# Patient Record
Sex: MALE | Race: WHITE | Employment: FULL TIME | URBAN - METROPOLITAN AREA
[De-identification: names, ages, dates, MRNs, and addresses within clinical notes are randomized per-mention and may not be internally consistent; named-entity substitution may affect disease eponyms.]

---

## 2018-03-01 PROBLEM — Z79.899 MEDICATION MANAGEMENT: Status: ACTIVE | Noted: 2018-03-01

## 2019-02-04 DIAGNOSIS — Z79.899 MEDICATION MANAGEMENT: ICD-10-CM

## 2019-02-04 DIAGNOSIS — F41.9 ANXIETY: ICD-10-CM

## 2019-02-05 RX ORDER — CLONAZEPAM 1 MG/1
1 TABLET ORAL 2 TIMES DAILY PRN
Qty: 60 TABLET | Refills: 0 | Status: SHIPPED | OUTPATIENT
Start: 2019-02-05 | End: 2019-03-20 | Stop reason: SDUPTHER

## 2019-02-12 ENCOUNTER — TELEPHONE (OUTPATIENT)
Dept: PRIMARY CARE CLINIC | Age: 39
End: 2019-02-12

## 2019-02-12 DIAGNOSIS — I10 ESSENTIAL HYPERTENSION: Primary | ICD-10-CM

## 2019-02-12 RX ORDER — IRBESARTAN 150 MG/1
150 TABLET ORAL DAILY
Qty: 90 TABLET | Refills: 0 | Status: SHIPPED | OUTPATIENT
Start: 2019-02-12 | End: 2019-12-18 | Stop reason: SDUPTHER

## 2019-03-20 DIAGNOSIS — K21.9 GASTROESOPHAGEAL REFLUX DISEASE WITHOUT ESOPHAGITIS: ICD-10-CM

## 2019-03-20 DIAGNOSIS — Z79.899 MEDICATION MANAGEMENT: ICD-10-CM

## 2019-03-20 DIAGNOSIS — F41.9 ANXIETY: ICD-10-CM

## 2019-03-20 RX ORDER — OMEPRAZOLE 20 MG/1
20 CAPSULE, DELAYED RELEASE ORAL DAILY
Qty: 90 CAPSULE | Refills: 1 | Status: SHIPPED | OUTPATIENT
Start: 2019-03-20 | End: 2019-08-28 | Stop reason: SDUPTHER

## 2019-03-20 RX ORDER — CLONAZEPAM 1 MG/1
1 TABLET ORAL 2 TIMES DAILY PRN
Qty: 60 TABLET | Refills: 0 | Status: SHIPPED | OUTPATIENT
Start: 2019-03-20 | End: 2019-08-20 | Stop reason: SDUPTHER

## 2019-08-20 DIAGNOSIS — Z79.899 MEDICATION MANAGEMENT: ICD-10-CM

## 2019-08-20 DIAGNOSIS — F41.9 ANXIETY: ICD-10-CM

## 2019-08-20 RX ORDER — CLONAZEPAM 1 MG/1
1 TABLET ORAL 2 TIMES DAILY PRN
Qty: 14 TABLET | Refills: 0 | Status: SHIPPED | OUTPATIENT
Start: 2019-08-20 | End: 2019-08-28 | Stop reason: SDUPTHER

## 2019-08-20 NOTE — TELEPHONE ENCOUNTER
OV: 11/27/19, LF: 3/20/19. Out of medication, Reports now back in area, has few days off work next week and has scheduled appt for 8/28/19. Requesting short term to get through until appt-takes PRN.

## 2019-08-28 ENCOUNTER — OFFICE VISIT (OUTPATIENT)
Dept: PRIMARY CARE CLINIC | Age: 39
End: 2019-08-28
Payer: COMMERCIAL

## 2019-08-28 VITALS
SYSTOLIC BLOOD PRESSURE: 118 MMHG | HEART RATE: 71 BPM | BODY MASS INDEX: 28.85 KG/M2 | OXYGEN SATURATION: 99 % | WEIGHT: 184.2 LBS | DIASTOLIC BLOOD PRESSURE: 70 MMHG

## 2019-08-28 DIAGNOSIS — J45.40 MODERATE PERSISTENT ASTHMA WITHOUT COMPLICATION: ICD-10-CM

## 2019-08-28 DIAGNOSIS — Z28.21 REFUSED INFLUENZA VACCINE: ICD-10-CM

## 2019-08-28 DIAGNOSIS — Z79.899 MEDICATION MANAGEMENT: Primary | ICD-10-CM

## 2019-08-28 DIAGNOSIS — I10 ESSENTIAL HYPERTENSION: ICD-10-CM

## 2019-08-28 DIAGNOSIS — F41.9 ANXIETY: ICD-10-CM

## 2019-08-28 PROCEDURE — 99214 OFFICE O/P EST MOD 30 MIN: CPT | Performed by: PHYSICIAN ASSISTANT

## 2019-08-28 RX ORDER — CLONAZEPAM 1 MG/1
1 TABLET ORAL 2 TIMES DAILY PRN
Qty: 60 TABLET | Refills: 0 | Status: SHIPPED | OUTPATIENT
Start: 2019-08-28 | End: 2019-12-18 | Stop reason: SDUPTHER

## 2019-08-28 RX ORDER — OMEPRAZOLE 20 MG/1
20 CAPSULE, DELAYED RELEASE ORAL DAILY
Qty: 90 CAPSULE | Refills: 1 | Status: SHIPPED | OUTPATIENT
Start: 2019-08-28 | End: 2020-03-13 | Stop reason: SDUPTHER

## 2019-08-28 ASSESSMENT — PATIENT HEALTH QUESTIONNAIRE - PHQ9
2. FEELING DOWN, DEPRESSED OR HOPELESS: 0
SUM OF ALL RESPONSES TO PHQ9 QUESTIONS 1 & 2: 0
SUM OF ALL RESPONSES TO PHQ QUESTIONS 1-9: 0
1. LITTLE INTEREST OR PLEASURE IN DOING THINGS: 0
SUM OF ALL RESPONSES TO PHQ QUESTIONS 1-9: 0

## 2019-08-28 NOTE — PROGRESS NOTES
[address] [facility] PRIMARY CARE  [address] [facility] [address]  Dept: [phone]    Asuncion Orta is a 43 y.o. male who presents today for his medical conditions/complaintsas noted below. Chief Complaint   Patient presents with    Hypertension     med check    Anxiety     med check. Pt states he was getting better for awhile, however, the last couple weeks he has been using the Klonopin more often. Pt's son is going through gender transition, pt having hard time coping. 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 Got home a couple weeks ago. Anxiety:  Lots of stress lately. Feels overwhelmed, but not depressed.  -Oldest son dx with Hep C due to drug use- in rehab in [place], but [address] in for pt's 39th bday  -Daughter has been depressed  -Youngest son is transgender and transitioning to female. Last couple weeks, he has taken klonopin at least once per day, needs two tabs a couple days per week. Tries to take the klonopin when first feels heavier breathing and sweating. Not having full blown panic attacks because he is catching the symptoms early. Declined daily medication for anxiety. Getting a puppy in 5 weeks, which he hopes will help his anxiety. Did not get labs done. Took ziacam and vitamins while he was deployed   Thinks he had a tetanus vaccine about 2 years ago after cutting his hand on a fence. No issues with BP, but has not checked it. No CP, SOB, leg swelling, lightheadedness, or frequent HAs. Asthma-   Not using symbicort unless he feels he needs it, which is about once per month. Hasn't used albuterol in a long time. Cats trigger his symptoms. Occasional cough, but not as bad as it was when he first moved here. No SOB or wheezing.       No results found for: LDLCHOLESTEROL, LDLCALC    (goal LDL is Sig: Take 1 tablet by mouth 2 times daily as needed for Anxiety for up to 30 days. Dispense:  60 tablet     Refill:  0       Patient given educationalmaterials - see patient instructions. Discussed use, benefit, and side effectsof prescribed medications. All patient questions answered. Pt voiced understanding. Reviewed health maintenance. Instructed to continue current medications, diet andexercise. Patient agreed with treatment plan. Follow up as directed.      Electronicallysigned by Francine Mcguire PA-C on 9/4/2019 at 2:54 AM

## 2019-09-04 ASSESSMENT — ENCOUNTER SYMPTOMS
WHEEZING: 0
SHORTNESS OF BREATH: 0

## 2019-12-18 RX ORDER — CLONAZEPAM 1 MG/1
1 TABLET ORAL 2 TIMES DAILY PRN
Qty: 60 TABLET | Refills: 0 | Status: SHIPPED | OUTPATIENT
Start: 2019-12-18 | End: 2020-03-13 | Stop reason: SDUPTHER

## 2019-12-18 RX ORDER — IRBESARTAN 150 MG/1
150 TABLET ORAL DAILY
Qty: 90 TABLET | Refills: 1 | Status: SHIPPED | OUTPATIENT
Start: 2019-12-18 | End: 2020-06-13

## 2019-12-18 NOTE — TELEPHONE ENCOUNTER
Med refill  Last ov 8/28/19    clonazepam  Last ordered 8/28/19    Irbesartan  Last ordered 2/12/19    Kunal Vu

## 2020-03-12 NOTE — TELEPHONE ENCOUNTER
Pt cld scheduled appt for quarterly med check for Friday 3/20 but is in need of refills as he will run out prior to this appt- Pt requesting refills on 4 meds:    Clonazepam 1 mg  Irbesartan-150 mg  Omeprazole 20 mg  Simethicone 80 mg     He would like scripts to go to [email or web address] in [address] [place] [address] JJJ#[phone]     Please contact pt once completed at #[phone]    Last ov-8/28/19  Next ov-3/20/20

## 2020-03-13 RX ORDER — IRBESARTAN 150 MG/1
150 TABLET ORAL DAILY
Qty: 90 TABLET | Refills: 1 | OUTPATIENT
Start: 2020-03-13

## 2020-03-13 RX ORDER — OMEPRAZOLE 20 MG/1
20 CAPSULE, DELAYED RELEASE ORAL DAILY
Qty: 90 CAPSULE | Refills: 1 | Status: SHIPPED | OUTPATIENT
Start: 2020-03-13 | End: 2020-11-13 | Stop reason: SDUPTHER

## 2020-03-13 RX ORDER — CLONAZEPAM 1 MG/1
1 TABLET ORAL 2 TIMES DAILY PRN
Qty: 7 TABLET | Refills: 0 | Status: SHIPPED | OUTPATIENT
Start: 2020-03-13 | End: 2020-03-19 | Stop reason: SDUPTHER

## 2020-03-19 ENCOUNTER — OFFICE VISIT (OUTPATIENT)
Dept: PRIMARY CARE CLINIC | Age: 40
End: 2020-03-19
Payer: COMMERCIAL

## 2020-03-19 VITALS
HEART RATE: 76 BPM | DIASTOLIC BLOOD PRESSURE: 74 MMHG | BODY MASS INDEX: 31.17 KG/M2 | HEIGHT: 67 IN | SYSTOLIC BLOOD PRESSURE: 122 MMHG | WEIGHT: 198.6 LBS | OXYGEN SATURATION: 97 % | TEMPERATURE: 98.7 F

## 2020-03-19 PROCEDURE — 99213 OFFICE O/P EST LOW 20 MIN: CPT | Performed by: PHYSICIAN ASSISTANT

## 2020-03-19 RX ORDER — CLONAZEPAM 1 MG/1
1 TABLET ORAL 2 TIMES DAILY PRN
Qty: 60 TABLET | Refills: 0 | Status: SHIPPED | OUTPATIENT
Start: 2020-03-19 | End: 2020-04-29 | Stop reason: SDUPTHER

## 2020-03-19 ASSESSMENT — PATIENT HEALTH QUESTIONNAIRE - PHQ9
2. FEELING DOWN, DEPRESSED OR HOPELESS: 0
SUM OF ALL RESPONSES TO PHQ QUESTIONS 1-9: 0
1. LITTLE INTEREST OR PLEASURE IN DOING THINGS: 0
SUM OF ALL RESPONSES TO PHQ QUESTIONS 1-9: 0
SUM OF ALL RESPONSES TO PHQ9 QUESTIONS 1 & 2: 0

## 2020-03-19 ASSESSMENT — ENCOUNTER SYMPTOMS
SHORTNESS OF BREATH: 0
WHEEZING: 0
COUGH: 1

## 2020-03-19 NOTE — PROGRESS NOTES
[address] [facility] PRIMARY CARE  [address] [facility] [address]  Dept: [phone]    Jennifer Pierce is a 35 y.o. male who presents today for his medical conditions/complaintsas noted below. Chief Complaint   Patient presents with    Anxiety     med check    Medication Refill     clonazePAM (KLONOPIN) 1 MG       HPI:     HPI   HTN- BP good. Has not checked it. Says he was not good the last month, but has been better about taking his irbesartan more consistently since he picked up his refill. Anxiety- Was doing better, but anxiety ramped up again the last month. His youngest son just got out of juvenile [facility]. Still dealing with daughter's depression, she is out of work. 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 Has seen therapist 3-4x the last month, Dickson Gill. Did EMDR, but more so stress relief techniques now. Med contract on file from 8/28/2019. Taking klonopin at least once per day, every few days, he needs 2 tabs per day. Asthma- no flare ups. Hasn't used symbicort or albuterol in the last month. No wheezing, no SOB. Coughs only when anxious. When his son turns 24 YO in 4 months, he plants to request a transfer to [address] [facility].      No results found for: LDLCHOLESTEROL, LDLCALC    (goal LDL is <100)   No results found for: AST, ALT, BUN  BP Readings from Last 3 Encounters:   03/19/20 122/74   08/28/19 118/70   11/27/18 128/78          (goal 120/80)    Past Medical History:   Diagnosis Date    Anxiety     Asthma     Hypertension       Past Surgical History:   Procedure Laterality Date    CYST INCISION AND DRAINAGE      KNEE SURGERY      KNEE SURGERY Right        Family History   Problem Relation Age of Onset    Other Mother     No Known Problems Father     Cancer Maternal Uncle         abdominal area       Social History     Tobacco Use    Smoking status: Light Tobacco Smoker     Types: Cigarettes  Smokeless tobacco: Never Used   Substance Use Topics    Alcohol use: Yes     Comment: socially      Current Outpatient Medications   Medication Sig Dispense Refill    clonazePAM (KLONOPIN) 1 MG tablet Take 1 tablet by mouth 2 times daily as needed for Anxiety for up to 30 days. 60 tablet 0    omeprazole (PRILOSEC) 20 MG delayed release capsule Take 1 capsule by mouth Daily 90 capsule 1    Simethicone 80 MG TABS Take 80 mg by mouth every 6 hours as needed (bloating) After meals 90 tablet 1    irbesartan (AVAPRO) 150 MG tablet Take 1 tablet by mouth daily 90 tablet 1    albuterol sulfate HFA (PROAIR HFA) 108 (90 BASE) MCG/ACT inhaler Inhale 2 puffs into the lungs every 6 hours as needed for Wheezing 1 Inhaler 3    budesonide-formoterol (SYMBICORT) 80-4.5 MCG/ACT AERO Inhale 2 puffs into the lungs 2 times daily 1 Inhaler 2     No current facility-administered medications for this visit. Allergies   Allergen Reactions    Other      Animals- cats mostly       Health Maintenance   Topic Date Due    Potassium monitoring  1979    Creatinine monitoring  1979    HIV screen  09/26/1994    DTaP/Tdap/Td vaccine (1 - Tdap) 09/26/1998    Lipid screen  09/26/2019    Diabetes screen  09/26/2019    Flu vaccine (1) 09/01/2020 (Originally 9/1/2019)    Shingles Vaccine (1 of 2) 09/26/2029    Pneumococcal 0-64 years Vaccine  Completed    Hepatitis A vaccine  Aged Out    Hepatitis B vaccine  Aged Out    Hib vaccine  Aged Out    Meningococcal (ACWY) vaccine  Aged Out       Subjective:      Review of Systems   Respiratory: Positive for cough (when anxious). Negative for shortness of breath and wheezing. Cardiovascular: Negative for chest pain and leg swelling. Neurological: Negative for light-headedness and headaches. Psychiatric/Behavioral: The patient is nervous/anxious.         Objective:     /74 (Site: Left Upper Arm, Position: Sitting)   Pulse 76   Temp 98.7 °F (37.1 °C)   Ht 5' 7\" (1.702 m)   Wt 198 lb 9.6 oz (90.1 kg)   SpO2 97%   BMI 31.11 kg/m²   Physical Exam  Vitals signs and nursing note reviewed. Constitutional:       General: He is not in acute distress. Appearance: He is well-developed. He is not diaphoretic. HENT:      Head: Normocephalic and atraumatic. Neck:      Vascular: No carotid bruit. Cardiovascular:      Rate and Rhythm: Normal rate and regular rhythm. Heart sounds: Normal heart sounds. Comments: No lower leg edema  Pulmonary:      Effort: Pulmonary effort is normal.      Breath sounds: Normal breath sounds. Skin:     Findings: No rash. Neurological:      Mental Status: He is alert. Psychiatric:         Behavior: Behavior normal.         Assessment:       Diagnosis Orders   1. Essential hypertension  Basic Metabolic Panel   2. Medication management  clonazePAM (KLONOPIN) 1 MG tablet   3. Anxiety  clonazePAM (KLONOPIN) 1 MG tablet   4. Uncomplicated asthma, unspecified asthma severity, unspecified whether persistent          Plan:    1. HTN- Continue current dose of irbesartan. Pt still has not gotten labs done, but SONA Snell reminded pt to do so. 2-3 Anxiety- med contract on file. Continue seeing therapist and decrease use of klonopin again when able. 4. Asthma- Pt denies any symptoms recently and has not had to use his inhalers. Return in about 3 months (around 6/19/2020) for anxiety, HTN. Orders Placed This Encounter   Procedures    Basic Metabolic Panel     Standing Status:   Future     Standing Expiration Date:   3/19/2021     Orders Placed This Encounter   Medications    clonazePAM (KLONOPIN) 1 MG tablet     Sig: Take 1 tablet by mouth 2 times daily as needed for Anxiety for up to 30 days. Dispense:  60 tablet     Refill:  0       Patient given educationalmaterials - see patient instructions. Discussed use, benefit, and side effectsof prescribed medications. All patient questions answered.  Pt voiced understanding. Reviewed health maintenance. Instructed to continue current medications, diet andexercise. Patient agreed with treatment plan. Follow up as directed.      Electronicallysigned by Dayna Hope PA-C on 3/19/2020 at 12:04 PM

## 2020-04-29 RX ORDER — CLONAZEPAM 1 MG/1
1 TABLET ORAL 2 TIMES DAILY PRN
Qty: 60 TABLET | Refills: 0 | Status: SHIPPED | OUTPATIENT
Start: 2020-04-29 | End: 2020-08-14 | Stop reason: SDUPTHER

## 2020-06-13 RX ORDER — IRBESARTAN 150 MG/1
TABLET ORAL
Qty: 90 TABLET | Refills: 1 | Status: SHIPPED | OUTPATIENT
Start: 2020-06-13 | End: 2020-11-13 | Stop reason: SDUPTHER

## 2020-08-14 RX ORDER — CLONAZEPAM 1 MG/1
1 TABLET ORAL 2 TIMES DAILY PRN
Qty: 60 TABLET | Refills: 0 | Status: SHIPPED | OUTPATIENT
Start: 2020-08-14 | End: 2020-11-13 | Stop reason: SDUPTHER

## 2020-11-09 RX ORDER — CLONAZEPAM 1 MG/1
1 TABLET ORAL 2 TIMES DAILY PRN
Qty: 60 TABLET | Refills: 0 | OUTPATIENT
Start: 2020-11-09 | End: 2020-12-09

## 2020-11-13 ENCOUNTER — TELEMEDICINE (OUTPATIENT)
Dept: PRIMARY CARE CLINIC | Age: 40
End: 2020-11-13
Payer: COMMERCIAL

## 2020-11-13 PROCEDURE — 99214 OFFICE O/P EST MOD 30 MIN: CPT | Performed by: NURSE PRACTITIONER

## 2020-11-13 RX ORDER — IRBESARTAN 150 MG/1
TABLET ORAL
Qty: 90 TABLET | Refills: 0 | Status: SHIPPED | OUTPATIENT
Start: 2020-11-13 | End: 2021-03-03 | Stop reason: SDUPTHER

## 2020-11-13 RX ORDER — OMEPRAZOLE 20 MG/1
20 CAPSULE, DELAYED RELEASE ORAL DAILY
Qty: 90 CAPSULE | Refills: 0 | Status: SHIPPED | OUTPATIENT
Start: 2020-11-13 | End: 2021-12-07 | Stop reason: SDUPTHER

## 2020-11-13 RX ORDER — CLONAZEPAM 1 MG/1
1 TABLET ORAL 2 TIMES DAILY PRN
Qty: 60 TABLET | Refills: 0 | Status: SHIPPED | OUTPATIENT
Start: 2020-11-13 | End: 2020-12-28 | Stop reason: SDUPTHER

## 2020-11-13 ASSESSMENT — ENCOUNTER SYMPTOMS
COUGH: 0
SHORTNESS OF BREATH: 0
DIARRHEA: 0
WHEEZING: 0
VOMITING: 0
NAUSEA: 0

## 2020-11-13 NOTE — PROGRESS NOTES
[address] [facility] PRIMARY CARE  [address] [address] [facility] [place] [address]  Dept: [phone]    Kaci Kumar is a 38 y.o. male who presents today for his medical conditions/complaints as noted below. Chief Complaint   Patient presents with    Medication Check       HPI:     HPI Gayecorbinhalie Smith was originally scheduled an in person visit, but because my MD has requested I am out of the office during [facility] testing, we switched him to virtual. The patient is doing well other than some stress and anxiety going on in his family. His 25year old son has been in and out of long-term and is currently homeless and he worries a lot about him. His apartment complex will not allow his son to live on the premise d/t background check. Other than that, he is doing ok. No fever, chills, body aches, coughing, sob, wheezing, chest pains or heart palpitations. He has not completed the blood work ordered in March. Patient is traveling to [address] [place] [address] for work in the next couple of days.  He was frustrated with how the office handled his RX request.     No results found for: LDLCHOLESTEROL, [address]    (goal LDL is <100)   No results found for: AST, ALT, BUN  BP Readings from Last 3 Encounters:   03/19/20 122/74   08/28/19 118/70   11/27/18 128/78          (goal 120/80)    Past Medical History:   Diagnosis Date    Anxiety     Asthma     Hypertension       Past Surgical History:   Procedure Laterality Date    CYST INCISION AND DRAINAGE      KNEE SURGERY      KNEE SURGERY Right        Family History   Problem Relation Age of Onset    Other Mother     No Known Problems Father     Cancer Maternal Uncle         abdominal area       Social History     Tobacco Use    Smoking status: Light Tobacco Smoker     Types: Cigarettes    Smokeless tobacco: Never Used   Substance Use Topics    Alcohol use: Yes     Comment: socially      Current Outpatient Medications   Medication Sig Dispense Refill    omeprazole (PRILOSEC) 20 MG delayed release capsule Take 1 capsule by mouth Daily 90 capsule 0    irbesartan (AVAPRO) 150 MG tablet TAKE ONE TABLET BY MOUTH DAILY 90 tablet 0    clonazePAM (KLONOPIN) 1 MG tablet Take 1 tablet by mouth 2 times daily as needed for Anxiety for up to 30 days. 60 tablet 0    Simethicone 80 MG TABS Take 80 mg by mouth every 6 hours as needed (bloating) After meals 90 tablet 1    albuterol sulfate HFA (PROAIR HFA) 108 (90 BASE) MCG/ACT inhaler Inhale 2 puffs into the lungs every 6 hours as needed for Wheezing 1 Inhaler 3    budesonide-formoterol (SYMBICORT) 80-4.5 MCG/ACT AERO Inhale 2 puffs into the lungs 2 times daily 1 Inhaler 2     No current facility-administered medications for this visit. Allergies   Allergen Reactions    Other      Animals- cats mostly       Health Maintenance   Topic Date Due    Potassium monitoring  1979    Creatinine monitoring  1979    HIV screen  09/26/1994    DTaP/Tdap/Td vaccine (1 - Tdap) 09/26/1998    Lipid screen  09/26/2019    Diabetes screen  09/26/2019    Flu vaccine (1) 09/01/2020    Pneumococcal 0-64 years Vaccine  Completed    Hepatitis A vaccine  Aged Out    Hepatitis B vaccine  Aged Out    Hib vaccine  Aged Out    Meningococcal (ACWY) vaccine  Aged Out       Subjective:      Review of Systems   Constitutional: Negative for chills and fever. Respiratory: Negative for cough, shortness of breath and wheezing. Cardiovascular: Negative for chest pain and palpitations. Gastrointestinal: Negative for diarrhea, nausea and vomiting. Musculoskeletal: Negative for myalgias. Neurological: Negative for dizziness, light-headedness and headaches. Psychiatric/Behavioral: Negative for self-injury, sleep disturbance and suicidal ideas. The patient is nervous/anxious. +stress     Objective: There were no vitals taken for this visit. Physical Exam  Nursing note (limited assessment due to virtual video visit) reviewed. Vitals reviewed: none obtained. Constitutional:       Appearance: Normal appearance. HENT:      Head: Normocephalic and atraumatic. Neurological:      General: No focal deficit present. Mental Status: He is alert and oriented to person, place, and time. Mental status is at baseline. Psychiatric:         Attention and Perception: Attention and perception normal.         Mood and Affect: Mood and affect normal.         Speech: Speech normal.         Behavior: Behavior normal. Behavior is cooperative. Thought Content: Thought content normal.         Cognition and Memory: Cognition and memory normal.         Judgment: Judgment normal.       Controlled Substance Monitoring:    Acute and Chronic Pain Monitoring:   RX Monitoring 11/13/2020   Attestation -   Periodic Controlled Substance Monitoring Possible medication side effects, risk of tolerance/dependence & alternative treatments discussed. ;No signs of potential drug abuse or diversion identified. ;Assessed functional status. Chronic Pain > 80 MEDD -       Assessment:       Diagnosis Orders   1. Essential hypertension  irbesartan (AVAPRO) 150 MG tablet   2. Anxiety  clonazePAM (KLONOPIN) 1 MG tablet   3. Medication management  clonazePAM (KLONOPIN) 1 MG tablet   4. Acid indigestion  omeprazole (PRILOSEC) 20 MG delayed release capsule   5. Stress at home          Plan:    1. Complete blood work at [facility]. 2. Medication refills sent. 3. Follow up in person 3 months. 4. No vitals were obtained during the rooming process. Patient was short with staff and hung up. He wasn't happy that he had to do a virtual visit. 5. Call 911 if you develop chest pains, difficulty breathing or sudden severe headache. 6. Patient will be due for med contract renewal and drug screen during next office visit. Rowe Habermann is a 38 y.o. male being evaluated by a Virtual Visit (video visit) encounter to address concerns as mentioned above.   A caregiver was present when appropriate. Due to this being a TeleHealth encounter (During [facility]WL[facility]-57 public [facility] emergency), evaluation of the following organ systems was limited: Vitals/Constitutional/EENT/Resp/CV/GI//MS/Neuro/Skin/Heme-Lymph-Imm. Pursuant to the emergency declaration under the [facility], [address] [place] [address] and the [employer] Act, this Virtual Visit was conducted with patient's (and/or legal guardian's) consent, to reduce the patient's risk of exposure to COVID-19 and provide necessary medical care. The patient (and/or legal guardian) has also been advised to contact this office for worsening conditions or problems, and seek emergency medical treatment and/or call 911 if deemed necessary. Patient identification was verified at the start of the visit: Yes    Total time spent for this encounter: 25 minutes    Services were provided through a video synchronous discussion virtually to substitute for in-person clinic visit. Patient and provider were located at their individual homes. --CASANDRA Mccarty CNP on 11/13/2020 at 2:55 PM    An electronic signature was used to authenticate this note. Return in about 3 months (around 2/13/2021) for f/u hypertension, f/u depression/anxiety. No orders of the defined types were placed in this encounter. Orders Placed This Encounter   Medications    omeprazole (PRILOSEC) 20 MG delayed release capsule     Sig: Take 1 capsule by mouth Daily     Dispense:  90 capsule     Refill:  0    irbesartan (AVAPRO) 150 MG tablet     Sig: TAKE ONE TABLET BY MOUTH DAILY     Dispense:  90 tablet     Refill:  0    clonazePAM (KLONOPIN) 1 MG tablet     Sig: Take 1 tablet by mouth 2 times daily as needed for Anxiety for up to 30 days. Dispense:  60 tablet     Refill:  0       Patient given educationalmaterials - see patient instructions.   Discussed use, benefit, and side effectsof prescribed medications. All patient questions answered. Pt voiced understanding. Reviewed health maintenance. Instructed to continue current medications, diet andexercise. Patient agreed with treatment plan. Follow up as directed.      Electronicallysigned by CASANDRA Worrell CNP on 11/13/2020 at 2:55 PM

## 2020-11-13 NOTE — PATIENT INSTRUCTIONS
Patient Education        Anxiety Disorder: Care Instructions  Your Care Instructions     Anxiety is a normal reaction to stress. Difficult situations can cause you to have symptoms such as sweaty palms and a nervous feeling. In an anxiety disorder, the symptoms are far more severe. Constant worry, muscle tension, trouble sleeping, nausea and diarrhea, and other symptoms can make normal daily activities difficult or impossible. These symptoms may occur for no reason, and they can affect your work, school, or social life. Medicines, counseling, and self-care can all help. Follow-up care is a key part of your treatment and safety. Be sure to make and go to all appointments, and call your doctor if you are having problems. It's also a good idea to know your test results and keep a list of the medicines you take. How can you care for yourself at home? · Take medicines exactly as directed. Call your doctor if you think you are having a problem with your medicine. · Go to your counseling sessions and follow-up appointments. · Recognize and accept your anxiety. Then, when you are in a situation that makes you anxious, say to yourself, \"This is not an emergency. I feel uncomfortable, but I am not in danger. I can keep going even if I feel anxious. \"  · Be kind to your body:  ? Relieve tension with exercise or a massage. ? Get enough rest.  ? Avoid alcohol, caffeine, nicotine, and illegal drugs. They can increase your anxiety level and cause sleep problems. ? Learn and do relaxation techniques. See below for more about these techniques. · Engage your mind. Get out and do something you enjoy. Go to a funny movie, or take a walk or hike. Plan your day. Having too much or too little to do can make you anxious. · Keep a record of your symptoms. Discuss your fears with a good friend or family member, or join a support group for people with similar problems. Talking to others sometimes relieves stress.   · Get involved in play a relaxation tape while you drive a car. When should you call for help? Call 911 anytime you think you may need emergency care. For example, call if:    · You feel you cannot stop from hurting yourself or someone else. Keep the numbers for these national suicide hotlines: 4-758-170-TALK (4-750.882.6342) and 6-205-QTXGKSG (6-885.415.5277). If you or someone you know talks about suicide or feeling hopeless, get help right away. Watch closely for changes in your health, and be sure to contact your doctor if:    · You have anxiety or fear that affects your life.     · You have symptoms of anxiety that are new or different from those you had before. Where can you learn more? Go to https://Keeps[employer]ACE Healtheb.51intern.com Ã¨â€¹Â±Ã¨â€¦Â¾Ã§Â½â€˜. org and sign in to your [employer] account. Enter P754 in the [employer] box to learn more about \"Anxiety Disorder: Care Instructions. \"     If you do not have an account, please click on the \"Sign Up Now\" link. Current as of: January 31, 2020               Content Version: 12.6  © 3254-4020 [employer]. Care instructions adapted under license by [facility] ([facility]). If you have questions about a medical condition or this instruction, always ask your healthcare professional. [address] any warranty or liability for your use of this information.

## 2020-12-28 RX ORDER — CLONAZEPAM 1 MG/1
1 TABLET ORAL 2 TIMES DAILY PRN
Qty: 60 TABLET | Refills: 0 | Status: SHIPPED | OUTPATIENT
Start: 2020-12-28 | End: 2021-03-04 | Stop reason: SDUPTHER

## 2021-03-01 ENCOUNTER — TELEMEDICINE (OUTPATIENT)
Dept: PRIMARY CARE CLINIC | Age: 41
End: 2021-03-01
Payer: COMMERCIAL

## 2021-03-01 DIAGNOSIS — Z79.899 MEDICATION MANAGEMENT: ICD-10-CM

## 2021-03-01 DIAGNOSIS — F41.9 ANXIETY: ICD-10-CM

## 2021-03-01 DIAGNOSIS — R19.7 DIARRHEA, UNSPECIFIED TYPE: Primary | ICD-10-CM

## 2021-03-01 PROCEDURE — 99213 OFFICE O/P EST LOW 20 MIN: CPT | Performed by: PHYSICIAN ASSISTANT

## 2021-03-01 SDOH — ECONOMIC STABILITY: INCOME INSECURITY: HOW HARD IS IT FOR YOU TO PAY FOR THE VERY BASICS LIKE FOOD, HOUSING, MEDICAL CARE, AND HEATING?: PATIENT DECLINED

## 2021-03-01 SDOH — ECONOMIC STABILITY: TRANSPORTATION INSECURITY
IN THE PAST 12 MONTHS, HAS LACK OF TRANSPORTATION KEPT YOU FROM MEETINGS, WORK, OR FROM GETTING THINGS NEEDED FOR DAILY LIVING?: PATIENT DECLINED

## 2021-03-01 ASSESSMENT — PATIENT HEALTH QUESTIONNAIRE - PHQ9
SUM OF ALL RESPONSES TO PHQ QUESTIONS 1-9: 0
1. LITTLE INTEREST OR PLEASURE IN DOING THINGS: 0
SUM OF ALL RESPONSES TO PHQ9 QUESTIONS 1 & 2: 0

## 2021-03-01 NOTE — PROGRESS NOTES
Topics    Alcohol use: Yes     Comment: socially      Current Outpatient Medications   Medication Sig Dispense Refill    omeprazole (PRILOSEC) 20 MG delayed release capsule Take 1 capsule by mouth Daily 90 capsule 0    albuterol sulfate HFA (PROAIR HFA) 108 (90 BASE) MCG/ACT inhaler Inhale 2 puffs into the lungs every 6 hours as needed for Wheezing 1 Inhaler 3    budesonide-formoterol (SYMBICORT) 80-4.5 MCG/ACT AERO Inhale 2 puffs into the lungs 2 times daily 1 Inhaler 2    Simethicone 80 MG TABS Take 80 mg by mouth every 6 hours as needed (bloating) After meals 90 tablet 1    clonazePAM (KLONOPIN) 1 MG tablet Take 1 tablet by mouth 2 times daily as needed for Anxiety for up to 30 days. 60 tablet 0    irbesartan (AVAPRO) 150 MG tablet TAKE ONE TABLET BY MOUTH DAILY 90 tablet 0     No current facility-administered medications for this visit. Allergies   Allergen Reactions    Other      Animals- cats mostly       Health Maintenance   Topic Date Due    Potassium monitoring  Never done    Creatinine monitoring  Never done    Hepatitis C screen  Never done    HIV screen  Never done    DTaP/Tdap/Td vaccine (1 - Tdap) Never done    Lipid screen  Never done    Diabetes screen  Never done    Flu vaccine (1) 09/01/2020    Pneumococcal 0-64 years Vaccine  Completed    Hepatitis A vaccine  Aged Out    Hepatitis B vaccine  Aged Out    Hib vaccine  Aged Out    Meningococcal (ACWY) vaccine  Aged Out       Subjective:      Review of Systems   Constitutional: Negative for fever. HENT: Negative for congestion and sore throat. Respiratory: Negative for cough. Cardiovascular: Negative for chest pain. Gastrointestinal: Positive for abdominal pain and diarrhea. Negative for blood in stool, nausea and vomiting. Neurological: Negative for light-headedness and headaches. Objective: There were no vitals taken for this visit.    Patient-Reported Vitals 3/1/2021   Patient-Reported Temperature 98.6 Physical Exam  Vitals signs and nursing note reviewed. Constitutional:       General: He is not in acute distress. Appearance: Normal appearance. He is not ill-appearing. HENT:      Head: Normocephalic and atraumatic. Pulmonary:      Effort: Pulmonary effort is normal. No respiratory distress. Neurological:      Mental Status: He is alert. Assessment:       Diagnosis Orders   1. Diarrhea, unspecified type     2. Medication management     3. Anxiety          Plan:     1. Diarrhea  Needs work note for today, return tomorrow. Pt declined testing for COVID unless diarrhea does not improve (pt attributes it to something he ate). 2-3 Anxiety  Reviewed OARRS. Asked SONA Ricardo to mail pt a new medication contract. Inderjit Almonte, was evaluated through a synchronous (real-time) audio-video encounter. The patient (or guardian if applicable) is aware that this is a billable service. Verbal consent to proceed has been obtained within the past 12 months. The visit was conducted pursuant to the emergency declaration under the [address] [place] [address] authority and the [employer] and [employer] General Act. The patient was located in a state where the provider was credentialed to provide care. --Mera Rodriguez PA-C on 3/7/2021 at 10:36 PM    An electronic signature was used to authenticate this note. Return in about 3 months (around 6/1/2021), or if symptoms worsen or fail to improve, for anxiety med check. No orders of the defined types were placed in this encounter. No orders of the defined types were placed in this encounter. Patient given educationalmaterials - see patient instructions. Discussed use, benefit, and side effectsof prescribed medications. All patient questions answered. Pt voiced understanding. Reviewed health maintenance.   Instructed to continue current medications, diet andexercise. Patient agreed with treatment plan. Follow up as directed.      Electronicallysigned by Facundo Mendes PA-C on 3/7/2021 at 10:36 PM

## 2021-03-03 DIAGNOSIS — I10 ESSENTIAL HYPERTENSION: ICD-10-CM

## 2021-03-03 RX ORDER — IRBESARTAN 150 MG/1
TABLET ORAL
Qty: 90 TABLET | Refills: 0 | Status: SHIPPED | OUTPATIENT
Start: 2021-03-03 | End: 2021-06-01

## 2021-03-07 ASSESSMENT — ENCOUNTER SYMPTOMS
COUGH: 0
ABDOMINAL PAIN: 1
BLOOD IN STOOL: 0
SORE THROAT: 0
NAUSEA: 0
VOMITING: 0
DIARRHEA: 1

## 2021-08-11 ENCOUNTER — HOSPITAL ENCOUNTER (INPATIENT)
Age: 41
LOS: 3 days | Discharge: HOME | DRG: 439 | End: 2021-08-14
Attending: EMERGENCY MEDICINE | Admitting: STUDENT IN AN ORGANIZED HEALTH CARE EDUCATION/TRAINING PROGRAM
Payer: COMMERCIAL

## 2021-08-11 ENCOUNTER — APPOINTMENT (OUTPATIENT)
Dept: CT IMAGING | Age: 41
DRG: 439 | End: 2021-08-11
Payer: COMMERCIAL

## 2021-08-11 DIAGNOSIS — K85.20 ALCOHOL-INDUCED ACUTE PANCREATITIS WITHOUT INFECTION OR NECROSIS: ICD-10-CM

## 2021-08-11 DIAGNOSIS — K85.00 IDIOPATHIC ACUTE PANCREATITIS, UNSPECIFIED COMPLICATION STATUS: Primary | ICD-10-CM

## 2021-08-11 LAB
ABSOLUTE EOS #: 0 K/UL (ref 0–0.4)
ABSOLUTE IMMATURE GRANULOCYTE: ABNORMAL K/UL (ref 0–0.3)
ABSOLUTE LYMPH #: 0.9 K/UL (ref 1–4.8)
ABSOLUTE MONO #: 1.1 K/UL (ref 0.1–1.2)
ALBUMIN SERPL-MCNC: 4.1 G/DL (ref 3.5–5.2)
ALBUMIN/GLOBULIN RATIO: 1.1 (ref 1–2.5)
ALP BLD-CCNC: 114 U/L (ref 40–129)
ALT SERPL-CCNC: 98 U/L (ref 5–41)
ANION GAP SERPL CALCULATED.3IONS-SCNC: 19 MMOL/L (ref 9–17)
AST SERPL-CCNC: 117 U/L
BASOPHILS # BLD: 0 % (ref 0–2)
BASOPHILS ABSOLUTE: 0 K/UL (ref 0–0.2)
BILIRUB SERPL-MCNC: 0.96 MG/DL (ref 0.3–1.2)
BILIRUBIN DIRECT: 0.46 MG/DL
BILIRUBIN URINE: NEGATIVE
BILIRUBIN, INDIRECT: 0.5 MG/DL (ref 0–1)
BUN BLDV-MCNC: 16 MG/DL (ref 6–20)
BUN/CREAT BLD: ABNORMAL (ref 9–20)
CALCIUM SERPL-MCNC: 8.6 MG/DL (ref 8.6–10.4)
CHLORIDE BLD-SCNC: 90 MMOL/L (ref 98–107)
CO2: 16 MMOL/L (ref 20–31)
COLOR: YELLOW
COMMENT UA: NORMAL
CREAT SERPL-MCNC: 1.1 MG/DL (ref 0.7–1.2)
DIFFERENTIAL TYPE: ABNORMAL
EOSINOPHILS RELATIVE PERCENT: 0 % (ref 1–4)
GFR AFRICAN AMERICAN: >60 ML/MIN
GFR NON-AFRICAN AMERICAN: >60 ML/MIN
GFR SERPL CREATININE-BSD FRML MDRD: ABNORMAL ML/MIN/{1.73_M2}
GFR SERPL CREATININE-BSD FRML MDRD: ABNORMAL ML/MIN/{1.73_M2}
GLOBULIN: ABNORMAL G/DL (ref 1.5–3.8)
GLUCOSE BLD-MCNC: 121 MG/DL (ref 70–99)
GLUCOSE URINE: NEGATIVE
HCT VFR BLD CALC: 52.6 % (ref 41–53)
HEMOGLOBIN: 17.5 G/DL (ref 13.5–17.5)
IMMATURE GRANULOCYTES: ABNORMAL %
KETONES, URINE: NEGATIVE
LACTIC ACID: 2.7 MMOL/L (ref 0.5–2.2)
LACTIC ACID: 4.7 MMOL/L (ref 0.5–2.2)
LEUKOCYTE ESTERASE, URINE: NEGATIVE
LIPASE: 253 U/L (ref 13–60)
LYMPHOCYTES # BLD: 6 % (ref 24–44)
MCH RBC QN AUTO: 33.3 PG (ref 26–34)
MCHC RBC AUTO-ENTMCNC: 33.3 G/DL (ref 31–37)
MCV RBC AUTO: 100.1 FL (ref 80–100)
MONOCYTES # BLD: 7 % (ref 2–11)
NITRITE, URINE: NEGATIVE
NRBC AUTOMATED: ABNORMAL PER 100 WBC
PDW BLD-RTO: 12.8 % (ref 12.5–15.4)
PH UA: 5.5 (ref 5–8)
PLATELET # BLD: 264 K/UL (ref 140–450)
PLATELET ESTIMATE: ABNORMAL
PMV BLD AUTO: 8.9 FL (ref 6–12)
POTASSIUM SERPL-SCNC: 3.9 MMOL/L (ref 3.7–5.3)
PROTEIN UA: NEGATIVE
RBC # BLD: 5.26 M/UL (ref 4.5–5.9)
RBC # BLD: ABNORMAL 10*6/UL
SEG NEUTROPHILS: 87 % (ref 36–66)
SEGMENTED NEUTROPHILS ABSOLUTE COUNT: 14.7 K/UL (ref 1.8–7.7)
SODIUM BLD-SCNC: 125 MMOL/L (ref 135–144)
SPECIFIC GRAVITY UA: 1.02 (ref 1–1.03)
TOTAL PROTEIN: 8 G/DL (ref 6.4–8.3)
TROPONIN INTERP: NORMAL
TROPONIN T: NORMAL NG/ML
TROPONIN, HIGH SENSITIVITY: 9 NG/L (ref 0–22)
TURBIDITY: CLEAR
URINE HGB: NEGATIVE
UROBILINOGEN, URINE: NORMAL
WBC # BLD: 16.8 K/UL (ref 3.5–11)
WBC # BLD: ABNORMAL 10*3/UL

## 2021-08-11 PROCEDURE — 96372 THER/PROPH/DIAG INJ SC/IM: CPT

## 2021-08-11 PROCEDURE — 83605 ASSAY OF LACTIC ACID: CPT

## 2021-08-11 PROCEDURE — 80048 BASIC METABOLIC PNL TOTAL CA: CPT

## 2021-08-11 PROCEDURE — 96375 TX/PRO/DX INJ NEW DRUG ADDON: CPT

## 2021-08-11 PROCEDURE — 6360000002 HC RX W HCPCS: Performed by: NURSE PRACTITIONER

## 2021-08-11 PROCEDURE — 85025 COMPLETE CBC W/AUTO DIFF WBC: CPT

## 2021-08-11 PROCEDURE — 6360000002 HC RX W HCPCS: Performed by: EMERGENCY MEDICINE

## 2021-08-11 PROCEDURE — 74177 CT ABD & PELVIS W/CONTRAST: CPT

## 2021-08-11 PROCEDURE — 36415 COLL VENOUS BLD VENIPUNCTURE: CPT

## 2021-08-11 PROCEDURE — 2580000003 HC RX 258: Performed by: EMERGENCY MEDICINE

## 2021-08-11 PROCEDURE — 2580000003 HC RX 258: Performed by: NURSE PRACTITIONER

## 2021-08-11 PROCEDURE — 96374 THER/PROPH/DIAG INJ IV PUSH: CPT

## 2021-08-11 PROCEDURE — 96361 HYDRATE IV INFUSION ADD-ON: CPT

## 2021-08-11 PROCEDURE — 80076 HEPATIC FUNCTION PANEL: CPT

## 2021-08-11 PROCEDURE — 99285 EMERGENCY DEPT VISIT HI MDM: CPT

## 2021-08-11 PROCEDURE — 93005 ELECTROCARDIOGRAM TRACING: CPT | Performed by: EMERGENCY MEDICINE

## 2021-08-11 PROCEDURE — 83690 ASSAY OF LIPASE: CPT

## 2021-08-11 PROCEDURE — 2060000000 HC ICU INTERMEDIATE R&B

## 2021-08-11 PROCEDURE — 80074 ACUTE HEPATITIS PANEL: CPT

## 2021-08-11 PROCEDURE — 81003 URINALYSIS AUTO W/O SCOPE: CPT

## 2021-08-11 PROCEDURE — 6360000004 HC RX CONTRAST MEDICATION: Performed by: EMERGENCY MEDICINE

## 2021-08-11 PROCEDURE — 84484 ASSAY OF TROPONIN QUANT: CPT

## 2021-08-11 RX ORDER — SODIUM CHLORIDE 9 MG/ML
25 INJECTION, SOLUTION INTRAVENOUS PRN
Status: DISCONTINUED | OUTPATIENT
Start: 2021-08-11 | End: 2021-08-14 | Stop reason: HOSPADM

## 2021-08-11 RX ORDER — MAGNESIUM SULFATE 1 G/100ML
1000 INJECTION INTRAVENOUS PRN
Status: DISCONTINUED | OUTPATIENT
Start: 2021-08-11 | End: 2021-08-14 | Stop reason: HOSPADM

## 2021-08-11 RX ORDER — NICOTINE 21 MG/24HR
1 PATCH, TRANSDERMAL 24 HOURS TRANSDERMAL DAILY
Status: DISCONTINUED | OUTPATIENT
Start: 2021-08-11 | End: 2021-08-14 | Stop reason: HOSPADM

## 2021-08-11 RX ORDER — ONDANSETRON 4 MG/1
4 TABLET, ORALLY DISINTEGRATING ORAL EVERY 8 HOURS PRN
Status: DISCONTINUED | OUTPATIENT
Start: 2021-08-11 | End: 2021-08-14 | Stop reason: HOSPADM

## 2021-08-11 RX ORDER — SODIUM CHLORIDE 0.9 % (FLUSH) 0.9 %
5-40 SYRINGE (ML) INJECTION EVERY 12 HOURS SCHEDULED
Status: DISCONTINUED | OUTPATIENT
Start: 2021-08-11 | End: 2021-08-14 | Stop reason: HOSPADM

## 2021-08-11 RX ORDER — ONDANSETRON 2 MG/ML
4 INJECTION INTRAMUSCULAR; INTRAVENOUS ONCE
Status: COMPLETED | OUTPATIENT
Start: 2021-08-11 | End: 2021-08-11

## 2021-08-11 RX ORDER — ACETAMINOPHEN 325 MG/1
650 TABLET ORAL EVERY 4 HOURS PRN
Status: DISCONTINUED | OUTPATIENT
Start: 2021-08-11 | End: 2021-08-14 | Stop reason: HOSPADM

## 2021-08-11 RX ORDER — LORAZEPAM 1 MG/1
3 TABLET ORAL
Status: DISCONTINUED | OUTPATIENT
Start: 2021-08-11 | End: 2021-08-12

## 2021-08-11 RX ORDER — ONDANSETRON 2 MG/ML
4 INJECTION INTRAMUSCULAR; INTRAVENOUS EVERY 6 HOURS PRN
Status: DISCONTINUED | OUTPATIENT
Start: 2021-08-11 | End: 2021-08-14 | Stop reason: HOSPADM

## 2021-08-11 RX ORDER — SODIUM CHLORIDE 0.9 % (FLUSH) 0.9 %
5-40 SYRINGE (ML) INJECTION PRN
Status: DISCONTINUED | OUTPATIENT
Start: 2021-08-11 | End: 2021-08-14 | Stop reason: HOSPADM

## 2021-08-11 RX ORDER — LORAZEPAM 2 MG/ML
1 INJECTION INTRAMUSCULAR ONCE
Status: COMPLETED | OUTPATIENT
Start: 2021-08-11 | End: 2021-08-11

## 2021-08-11 RX ORDER — LORAZEPAM 1 MG/1
1 TABLET ORAL
Status: DISCONTINUED | OUTPATIENT
Start: 2021-08-11 | End: 2021-08-12

## 2021-08-11 RX ORDER — BUDESONIDE AND FORMOTEROL FUMARATE DIHYDRATE 80; 4.5 UG/1; UG/1
2 AEROSOL RESPIRATORY (INHALATION) 2 TIMES DAILY
Status: DISCONTINUED | OUTPATIENT
Start: 2021-08-11 | End: 2021-08-12

## 2021-08-11 RX ORDER — LORAZEPAM 2 MG/ML
4 INJECTION INTRAMUSCULAR
Status: DISCONTINUED | OUTPATIENT
Start: 2021-08-11 | End: 2021-08-12

## 2021-08-11 RX ORDER — POTASSIUM CHLORIDE 7.45 MG/ML
10 INJECTION INTRAVENOUS PRN
Status: DISCONTINUED | OUTPATIENT
Start: 2021-08-11 | End: 2021-08-14 | Stop reason: HOSPADM

## 2021-08-11 RX ORDER — DICYCLOMINE HYDROCHLORIDE 10 MG/ML
20 INJECTION INTRAMUSCULAR ONCE
Status: COMPLETED | OUTPATIENT
Start: 2021-08-11 | End: 2021-08-11

## 2021-08-11 RX ORDER — 0.9 % SODIUM CHLORIDE 0.9 %
80 INTRAVENOUS SOLUTION INTRAVENOUS ONCE
Status: DISCONTINUED | OUTPATIENT
Start: 2021-08-11 | End: 2021-08-11

## 2021-08-11 RX ORDER — LORAZEPAM 2 MG/ML
2 INJECTION INTRAMUSCULAR
Status: DISCONTINUED | OUTPATIENT
Start: 2021-08-11 | End: 2021-08-12

## 2021-08-11 RX ORDER — ALBUTEROL SULFATE 90 UG/1
2 AEROSOL, METERED RESPIRATORY (INHALATION) EVERY 6 HOURS PRN
Status: DISCONTINUED | OUTPATIENT
Start: 2021-08-11 | End: 2021-08-14 | Stop reason: HOSPADM

## 2021-08-11 RX ORDER — SODIUM CHLORIDE, SODIUM LACTATE, POTASSIUM CHLORIDE, CALCIUM CHLORIDE 600; 310; 30; 20 MG/100ML; MG/100ML; MG/100ML; MG/100ML
INJECTION, SOLUTION INTRAVENOUS CONTINUOUS
Status: ACTIVE | OUTPATIENT
Start: 2021-08-11 | End: 2021-08-12

## 2021-08-11 RX ORDER — 0.9 % SODIUM CHLORIDE 0.9 %
1000 INTRAVENOUS SOLUTION INTRAVENOUS ONCE
Status: DISCONTINUED | OUTPATIENT
Start: 2021-08-11 | End: 2021-08-11

## 2021-08-11 RX ORDER — LORAZEPAM 1 MG/1
4 TABLET ORAL
Status: DISCONTINUED | OUTPATIENT
Start: 2021-08-11 | End: 2021-08-12

## 2021-08-11 RX ORDER — LORAZEPAM 2 MG/ML
1 INJECTION INTRAMUSCULAR
Status: DISCONTINUED | OUTPATIENT
Start: 2021-08-11 | End: 2021-08-12

## 2021-08-11 RX ORDER — HYDROCODONE BITARTRATE AND ACETAMINOPHEN 5; 325 MG/1; MG/1
1 TABLET ORAL EVERY 4 HOURS PRN
Status: DISCONTINUED | OUTPATIENT
Start: 2021-08-11 | End: 2021-08-12

## 2021-08-11 RX ORDER — 0.9 % SODIUM CHLORIDE 0.9 %
1000 INTRAVENOUS SOLUTION INTRAVENOUS ONCE
Status: COMPLETED | OUTPATIENT
Start: 2021-08-11 | End: 2021-08-11

## 2021-08-11 RX ORDER — SODIUM CHLORIDE, SODIUM LACTATE, POTASSIUM CHLORIDE, AND CALCIUM CHLORIDE .6; .31; .03; .02 G/100ML; G/100ML; G/100ML; G/100ML
1000 INJECTION, SOLUTION INTRAVENOUS ONCE
Status: COMPLETED | OUTPATIENT
Start: 2021-08-11 | End: 2021-08-11

## 2021-08-11 RX ORDER — MORPHINE SULFATE 2 MG/ML
2 INJECTION, SOLUTION INTRAMUSCULAR; INTRAVENOUS ONCE
Status: COMPLETED | OUTPATIENT
Start: 2021-08-11 | End: 2021-08-11

## 2021-08-11 RX ORDER — LORAZEPAM 2 MG/ML
3 INJECTION INTRAMUSCULAR
Status: DISCONTINUED | OUTPATIENT
Start: 2021-08-11 | End: 2021-08-12

## 2021-08-11 RX ORDER — HYDROCODONE BITARTRATE AND ACETAMINOPHEN 5; 325 MG/1; MG/1
2 TABLET ORAL EVERY 4 HOURS PRN
Status: DISCONTINUED | OUTPATIENT
Start: 2021-08-11 | End: 2021-08-12

## 2021-08-11 RX ORDER — SODIUM CHLORIDE 0.9 % (FLUSH) 0.9 %
10 SYRINGE (ML) INJECTION PRN
Status: DISCONTINUED | OUTPATIENT
Start: 2021-08-11 | End: 2021-08-14 | Stop reason: HOSPADM

## 2021-08-11 RX ORDER — MAGNESIUM HYDROXIDE/ALUMINUM HYDROXICE/SIMETHICONE 120; 1200; 1200 MG/30ML; MG/30ML; MG/30ML
30 SUSPENSION ORAL
Status: DISCONTINUED | OUTPATIENT
Start: 2021-08-11 | End: 2021-08-11

## 2021-08-11 RX ORDER — LORAZEPAM 1 MG/1
2 TABLET ORAL
Status: DISCONTINUED | OUTPATIENT
Start: 2021-08-11 | End: 2021-08-12

## 2021-08-11 RX ORDER — SODIUM CHLORIDE 9 MG/ML
INJECTION, SOLUTION INTRAVENOUS CONTINUOUS
Status: DISCONTINUED | OUTPATIENT
Start: 2021-08-12 | End: 2021-08-12

## 2021-08-11 RX ADMIN — SODIUM CHLORIDE 1000 ML: 9 INJECTION, SOLUTION INTRAVENOUS at 17:37

## 2021-08-11 RX ADMIN — LORAZEPAM 1 MG: 2 INJECTION INTRAMUSCULAR; INTRAVENOUS at 20:38

## 2021-08-11 RX ADMIN — IOPAMIDOL 75 ML: 755 INJECTION, SOLUTION INTRAVENOUS at 16:42

## 2021-08-11 RX ADMIN — SODIUM CHLORIDE, PRESERVATIVE FREE 10 ML: 5 INJECTION INTRAVENOUS at 16:42

## 2021-08-11 RX ADMIN — SODIUM CHLORIDE, POTASSIUM CHLORIDE, SODIUM LACTATE AND CALCIUM CHLORIDE: 600; 310; 30; 20 INJECTION, SOLUTION INTRAVENOUS at 19:18

## 2021-08-11 RX ADMIN — SODIUM CHLORIDE, POTASSIUM CHLORIDE, SODIUM LACTATE AND CALCIUM CHLORIDE: 600; 310; 30; 20 INJECTION, SOLUTION INTRAVENOUS at 23:36

## 2021-08-11 RX ADMIN — MORPHINE SULFATE 2 MG: 2 INJECTION, SOLUTION INTRAMUSCULAR; INTRAVENOUS at 17:37

## 2021-08-11 RX ADMIN — HYDROMORPHONE HYDROCHLORIDE 0.5 MG: 1 INJECTION, SOLUTION INTRAMUSCULAR; INTRAVENOUS; SUBCUTANEOUS at 19:18

## 2021-08-11 RX ADMIN — SODIUM CHLORIDE, POTASSIUM CHLORIDE, SODIUM LACTATE AND CALCIUM CHLORIDE 1000 ML: 600; 310; 30; 20 INJECTION, SOLUTION INTRAVENOUS at 15:46

## 2021-08-11 RX ADMIN — DICYCLOMINE HYDROCHLORIDE 20 MG: 10 INJECTION, SOLUTION INTRAMUSCULAR at 15:43

## 2021-08-11 RX ADMIN — ONDANSETRON 4 MG: 2 INJECTION INTRAMUSCULAR; INTRAVENOUS at 19:18

## 2021-08-11 RX ADMIN — ONDANSETRON 4 MG: 2 INJECTION INTRAMUSCULAR; INTRAVENOUS at 15:41

## 2021-08-11 RX ADMIN — HYDROMORPHONE HYDROCHLORIDE 0.25 MG: 1 INJECTION, SOLUTION INTRAMUSCULAR; INTRAVENOUS; SUBCUTANEOUS at 22:20

## 2021-08-11 ASSESSMENT — PAIN SCALES - GENERAL
PAINLEVEL_OUTOF10: 3
PAINLEVEL_OUTOF10: 7
PAINLEVEL_OUTOF10: 0
PAINLEVEL_OUTOF10: 0
PAINLEVEL_OUTOF10: 8
PAINLEVEL_OUTOF10: 8
PAINLEVEL_OUTOF10: 4

## 2021-08-11 ASSESSMENT — PAIN DESCRIPTION - FREQUENCY
FREQUENCY: CONTINUOUS
FREQUENCY: CONTINUOUS

## 2021-08-11 ASSESSMENT — PAIN DESCRIPTION - ORIENTATION
ORIENTATION: MID
ORIENTATION: MID

## 2021-08-11 ASSESSMENT — PAIN DESCRIPTION - PROGRESSION
CLINICAL_PROGRESSION: NOT CHANGED
CLINICAL_PROGRESSION: NOT CHANGED

## 2021-08-11 ASSESSMENT — PAIN DESCRIPTION - ONSET
ONSET: ON-GOING
ONSET: ON-GOING

## 2021-08-11 ASSESSMENT — PAIN DESCRIPTION - PAIN TYPE
TYPE: ACUTE PAIN

## 2021-08-11 ASSESSMENT — PAIN DESCRIPTION - DESCRIPTORS
DESCRIPTORS: ACHING;CONSTANT
DESCRIPTORS: ACHING;CONSTANT

## 2021-08-11 ASSESSMENT — ENCOUNTER SYMPTOMS
NAUSEA: 1
ABDOMINAL PAIN: 1
VOMITING: 1

## 2021-08-11 ASSESSMENT — PAIN DESCRIPTION - LOCATION
LOCATION: ABDOMEN

## 2021-08-11 ASSESSMENT — PAIN - FUNCTIONAL ASSESSMENT
PAIN_FUNCTIONAL_ASSESSMENT: PREVENTS OR INTERFERES SOME ACTIVE ACTIVITIES AND ADLS
PAIN_FUNCTIONAL_ASSESSMENT: PREVENTS OR INTERFERES SOME ACTIVE ACTIVITIES AND ADLS

## 2021-08-11 NOTE — PROGRESS NOTES
Pt arrived to floor via stretcher from ED and ambulatedto bed. Telemetry activated. Patient oriented to room and use of call light. Call light and personal items within reach. Admission and assessment initiated. POC and education initiated and reviewed with patient. Telemetry-1  . Denied further needs or questions at this time. Will continue to monitor.

## 2021-08-11 NOTE — LETTER
[facility]  [address] [place] [address]  Phone: [phone]             August 14, 2021    Patient: Jean Guevara   YOB: 1980   Date of Visit: 8/11/2021       To Whom It May Concern:    Jean Guevara was seen and treated in our facility  beginning 8/11/2021 until 8/14/2021  . He may return to work on 8/16/2021.       Sincerely,       CASANDRA Short CNP         Signature:__________________________________

## 2021-08-11 NOTE — ED NOTES
Pt to room via wc- pt c/o of LLQ abd pain ongoing 15 hours-multiple episodes emesis. Pt reports radiation to left flank. Pt states he has always had unspecified \"abd issues\" for years. Pt denies dysuria/bowel issues. Pt AOx4. RR easy, unlabored.  PWD     Brigitte Irwin RN  08/11/21 3980

## 2021-08-11 NOTE — ED PROVIDER NOTES
[id number] [facility] ED  [address] [place] [address]  Phone: [phone]  Fax: [phone]        Pt Name: Judah Cummings  MRN: [id number]  [place] 1980  Date of evaluation: 8/11/21      CHIEF COMPLAINT     Chief Complaint   Patient presents with    Abdominal Pain    Emesis         HISTORY OF PRESENT ILLNESS  (Location/Symptom, Timing/Onset, Context/Setting, Quality, Duration, Modifying Factors, Severity.)    Judah Cummings is a 38 y.o. male who presents with abdominal pain. The patient states for 7 years he has had intermittent abdominal pain of which he cannot contribute it to anything states that intermittently he will develop abdominal pain last night he again developed abdominal pain associate with some nausea vomiting no blood in his emesis no blood in his stool no chest pain or shortness of breath no fever no chills nothing he does makes his symptoms better or worse      REVIEW OF SYSTEMS    (2-9 systems for level 4, 10 or more for level 5)     Review of Systems   Gastrointestinal: Positive for abdominal pain, nausea and vomiting. All other systems reviewed and are negative. PAST MEDICAL HISTORY    has a past medical history of Anxiety, Asthma, and Hypertension. SURGICAL HISTORY      has a past surgical history that includes knee surgery; cyst incision and drainage; and knee surgery (Right). CURRENTMEDICATIONS       Previous Medications    ALBUTEROL SULFATE HFA (PROAIR HFA) 108 (90 BASE) MCG/ACT INHALER    Inhale 2 puffs into the lungs every 6 hours as needed for Wheezing    BUDESONIDE-FORMOTEROL (SYMBICORT) 80-4.5 MCG/ACT AERO    Inhale 2 puffs into the lungs 2 times daily    CLONAZEPAM (KLONOPIN) 1 MG TABLET    Take 1 tablet by mouth 2 times daily as needed for Anxiety for up to 30 days.     IRBESARTAN (AVAPRO) 150 MG TABLET    TAKE ONE TABLET BY MOUTH DAILY    OMEPRAZOLE (PRILOSEC) 20 MG DELAYED RELEASE CAPSULE    Take 1 capsule by mouth Daily SIMETHICONE 80 MG TABS    Take 80 mg by mouth every 6 hours as needed (bloating) After meals       ALLERGIES     is allergic to other. FAMILY HISTORY     He indicated that his mother is alive. He indicated that his father is alive. He indicated that the status of his maternal uncle is unknown.     family history includes Cancer in his maternal uncle; No Known Problems in his father; Other in his mother. SOCIAL HISTORY      reports that he has been smoking cigarettes. He has never used smokeless tobacco. He reports current alcohol use. He reports that he does not use drugs. PHYSICAL EXAM    (up to 7 for level 4, 8 or more for level 5)   INITIAL VITALS:  height is 5' 9\" (1.753 m) and weight is 79.4 kg (175 lb). His oral temperature is 98.4 °F (36.9 °C). His blood pressure is 120/109 (abnormal) and his pulse is 136. His respiration is 18 and oxygen saturation is 97%. Physical Exam  Vitals and nursing note reviewed. Constitutional:       Comments: Mild to moderate distress secondary to HPI   HENT:      Head: Normocephalic and atraumatic. Eyes:      Conjunctiva/sclera: Conjunctivae normal.   Cardiovascular:      Rate and Rhythm: Regular rhythm. Tachycardia present. Pulmonary:      Effort: Pulmonary effort is normal. No respiratory distress. Breath sounds: Normal breath sounds. No stridor. No wheezing, rhonchi or rales. Abdominal:      General: Bowel sounds are normal. There is no distension. Palpations: Abdomen is soft. There is no mass. Tenderness: There is abdominal tenderness. There is no right CVA tenderness, left CVA tenderness, guarding or rebound. Comments: Tenderness to palpation in the epigastric region only no other abdominal tenderness appreciated   Musculoskeletal:         General: Normal range of motion. Cervical back: Normal range of motion and neck supple. Skin:     General: Skin is warm and dry. Findings: No rash.    Neurological:      General: No focal deficit present. Mental Status: He is alert. DIFFERENTIAL DIAGNOSIS/ MDM:     I will establish an IV give the patient IV fluids check an EKG labs UA CT of the abdomen and pelvis    DIAGNOSTIC RESULTS     EKG: All EKG's are interpreted by the Emergency Department Physician who either signs or Co-signs this chart in the absence of a cardiologist.      Interpreted by Dylan Fernandez MD     Rhythm: Sinus tachycardia  Rate: 108  Axis: 47  Ectopy: none  Conduction: normal  ST Segments: no acute change  T Waves: no acute change  Q Waves: none    Clinical Impression: Sinus tachycardia with no acute changes/normal EKG. No acute infarction/ischemia noted. RADIOLOGY:        Interpretation per the Radiologist below, if available at the time of this note:    CT ABDOMEN PELVIS W IV CONTRAST Additional Contrast? None (Preliminary result)  Result time 08/11/21 17:15:05  Preliminary result by Eden Sy DO (08/11/21 17:15:05)                Impression:    1.  Moderate peripancreatic inflammatory change and indeterminate   peripancreatic fluid, compatible with acute pancreatitis.  The pancreatic   tail is ill-defined, raising concern for necrosis.  No encapsulated fluid   collections identified. 2.  Hepatic steatosis. 3.  Prominent gastric rugal folds, suggesting gastritis. Narrative:    EXAMINATION:   CT OF THE ABDOMEN AND PELVIS WITH CONTRAST 8/11/2021 3:36 pm     TECHNIQUE:   CT of the abdomen and pelvis was performed with the administration of   intravenous contrast. Multiplanar reformatted images are provided for review. Dose modulation, iterative reconstruction, and/or weight based adjustment of   the mA/kV was utilized to reduce the radiation dose to as low as reasonably   achievable. COMPARISON:   None.      HISTORY:   ORDERING SYSTEM PROVIDED HISTORY: abdominal pain   TECHNOLOGIST PROVIDED HISTORY:     abdominal pain   Decision Support Exception - unselect if not a suspected or confirmed   emergency medical condition->Emergency Medical Condition (MA)   Reason for Exam: abd pain; emesis   Acuity: Acute   Type of Exam: Initial   Relevant Medical/Surgical History: hx: HTN, asthma     FINDINGS:   Lower Chest:  Visualized portion of the lower chest demonstrates no acute   abnormality. Organs: Liver is mildly hypoattenuating.  Gallbladder is within normal   limits.  No intrahepatic or extrahepatic biliary dilatation.  Spleen is   unremarkable. Urszula Chelsea is a moderate amount of peripancreatic inflammatory   change and indeterminate fluid.  No pancreatic ductal dilatation.  No   encapsulated peripancreatic fluid collection.  Pancreatic tail is ill-defined. Adrenal glands are within normal limits. There is symmetric enhancement of the kidneys.  No hydronephrosis or   perinephric inflammation. GI/Bowel: There is prominence of the gastric rugal folds.  No abnormal bowel   distention.  No focal pericolonic inflammation.  No free air.  Inflammatory   changes centered at the pancreas are surrounding portions of the splenic   flexure.  Appendix appears to be normal.     Pelvis: Urinary bladder is within normal limits.  No pelvic lymphadenopathy. Peritoneum/Retroperitoneum: The abdominal aorta is normal in caliber.  There   is no retroperitoneal or mesenteric lymphadenopathy. Bones/Soft Tissues: There is no acute or suspicious osseous abnormality. Visualized superficial soft tissues are within normal limits.                 Preliminary result by Martita Zamarripa DO (08/11/21 17:10:25)                Impression:    1.  Moderate peripancreatic inflammatory change and indeterminate   peripancreatic fluid, compatible with acute pancreatitis.  The pancreatic   tail is ill-defined, raising concern for necrosis.  No encapsulated fluid   collections identified.  2.  Hepatic steatosis.  3.  Prominent gastric rugal   folds, suggesting gastritis.                    LABS:  Results for orders placed or performed during the hospital encounter of 08/11/21   CBC Auto Differential   Result Value Ref Range    WBC 16.8 (H) 3.5 - 11.0 k/uL    RBC 5.26 4.5 - 5.9 m/uL    Hemoglobin 17.5 13.5 - 17.5 g/dL    Hematocrit 52.6 41 - 53 %    .1 (H) 80 - 100 fL    MCH 33.3 26 - 34 pg    MCHC 33.3 31 - 37 g/dL    RDW 12.8 12.5 - 15.4 %    Platelets 717 591 - 468 k/uL    MPV 8.9 6.0 - 12.0 fL    NRBC Automated NOT REPORTED per 100 WBC    Differential Type NOT REPORTED     Seg Neutrophils 87 (H) 36 - 66 %    Lymphocytes 6 (L) 24 - 44 %    Monocytes 7 2 - 11 %    Eosinophils % 0 (L) 1 - 4 %    Basophils 0 0 - 2 %    Immature Granulocytes NOT REPORTED 0 %    Segs Absolute 14.70 (H) 1.8 - 7.7 k/uL    Absolute Lymph # 0.90 (L) 1.0 - 4.8 k/uL    Absolute Mono # 1.10 0.1 - 1.2 k/uL    Absolute Eos # 0.00 0.0 - 0.4 k/uL    Basophils Absolute 0.00 0.0 - 0.2 k/uL    Absolute Immature Granulocyte NOT REPORTED 0.00 - 0.30 k/uL    WBC Morphology NOT REPORTED     RBC Morphology NOT REPORTED     Platelet Estimate NOT REPORTED    Basic Metabolic Panel   Result Value Ref Range    Glucose 121 (H) 70 - 99 mg/dL    BUN 16 6 - 20 mg/dL    CREATININE 1.10 0.70 - 1.20 mg/dL    Bun/Cre Ratio NOT REPORTED 9 - 20    Calcium 8.6 8.6 - 10.4 mg/dL    Sodium 125 (L) 135 - 144 mmol/L    Potassium 3.9 3.7 - 5.3 mmol/L    Chloride 90 (L) 98 - 107 mmol/L    CO2 16 (L) 20 - 31 mmol/L    Anion Gap 19 (H) 9 - 17 mmol/L    GFR Non-African American >60 >60 mL/min    GFR African American >60 >60 mL/min    GFR Comment          GFR Staging NOT REPORTED    Lactic Acid   Result Value Ref Range    Lactic Acid 4.7 (H) 0.5 - 2.2 mmol/L   Hepatic Function Panel   Result Value Ref Range    Albumin 4.1 3.5 - 5.2 g/dL    Alkaline Phosphatase 114 40 - 129 U/L    ALT 98 (H) 5 - 41 U/L     (H) <40 U/L    Total Bilirubin 0.96 0.3 - 1.2 mg/dL    Bilirubin, Direct 0.46 (H) <0.31 mg/dL    Bilirubin, Indirect 0.50 0.00 - 1.00 mg/dL    Total Protein 8.0 6.4 - 8.3 g/dL    Globulin NOT REPORTED 1.5 - 3.8 g/dL    Albumin/Globulin Ratio 1.1 1.0 - 2.5   Lipase   Result Value Ref Range    Lipase 253 (H) 13 - 60 U/L   Urinalysis Reflex to Culture   Result Value Ref Range    Color, UA YELLOW YELLOW    Turbidity UA CLEAR CLEAR    Glucose, Ur NEGATIVE NEGATIVE    Bilirubin Urine NEGATIVE NEGATIVE    Ketones, Urine NEGATIVE NEGATIVE    Specific Gravity, UA 1.025 1.005 - 1.030    Urine Hgb NEGATIVE NEGATIVE    pH, UA 5.5 5.0 - 8.0    Protein, UA NEGATIVE NEGATIVE    Urobilinogen, Urine Normal Normal    Nitrite, Urine NEGATIVE NEGATIVE    Leukocyte Esterase, Urine NEGATIVE NEGATIVE    Urinalysis Comments       Microscopic exam not performed based on chemical results unless requested in original order. Urinalysis Comments          Urinalysis Comments       Utilizing a urinalysis as the only screening method to exclude a potential uropathogen can be unreliable in many patient populations. Rapid screening tests are less sensitive than culture and if UTI is a clinical possibility, culture should be considered despite a negative urinalysis.    Troponin   Result Value Ref Range    Troponin, High Sensitivity 9 0 - 22 ng/L    Troponin T NOT REPORTED <0.03 ng/mL    Troponin Interp NOT REPORTED            EMERGENCY DEPARTMENT COURSE:   Vitals:    Vitals:    08/11/21 1457   BP: (!) 120/109   Pulse: 136   Resp: 18   Temp: 98.4 °F (36.9 °C)   TempSrc: Oral   SpO2: 97%   Weight: 79.4 kg (175 lb)   Height: 5' 9\" (1.753 m)     -------------------------  BP: (!) 120/109, Temp: 98.4 °F (36.9 °C), Pulse: 136, Resp: 18      RE-EVALUATION:  Patient is noted to have pancreatitis he has an elevated lipase level elevated white blood cell count he is hyponatremic CT showed inflammatory changes and some peripancreatic fluid compatible with acute pancreatitis with the pancreatic tail ill-defined raising concerns for possible necrosis given this I do feel the patient warrants admission to hospital setting the patient is agreeable to this plan so will contact my hospitalist for admission    CONSULTS:  My hospitalist is kind of to admit the patient to her service    PROCEDURES:  None    FINAL IMPRESSION      1. Idiopathic acute pancreatitis, unspecified complication status          DISPOSITION/PLAN   DISPOSITION        CONDITION ON DISPOSITION:   Stable    PATIENT REFERRED TO:  No follow-up provider specified. DISCHARGE MEDICATIONS:  New Prescriptions    No medications on file       (Please note that portions of this note were completed with a voicerecognition program.  Efforts were made to edit the dictations but occasionally words are mis-transcribed.)    Immanuel Bacon MD,, MD, F.A.C.E.P.   Attending Emergency Medicine Physician       Immanuel Bacon MD  08/11/21 4018

## 2021-08-12 ENCOUNTER — APPOINTMENT (OUTPATIENT)
Dept: ULTRASOUND IMAGING | Age: 41
DRG: 439 | End: 2021-08-12
Payer: COMMERCIAL

## 2021-08-12 PROBLEM — F10.10 ETOH ABUSE: Status: ACTIVE | Noted: 2021-08-12

## 2021-08-12 PROBLEM — E83.42 HYPOMAGNESEMIA: Status: ACTIVE | Noted: 2021-08-12

## 2021-08-12 PROBLEM — E87.1 HYPONATREMIA: Status: ACTIVE | Noted: 2021-08-12

## 2021-08-12 LAB
ALBUMIN SERPL-MCNC: 3 G/DL (ref 3.5–5.2)
ALBUMIN/GLOBULIN RATIO: 1 (ref 1–2.5)
ALP BLD-CCNC: 90 U/L (ref 40–129)
ALT SERPL-CCNC: 63 U/L (ref 5–41)
AMYLASE: 54 U/L (ref 28–100)
ANION GAP SERPL CALCULATED.3IONS-SCNC: 11 MMOL/L (ref 9–17)
AST SERPL-CCNC: 74 U/L
BILIRUB SERPL-MCNC: 1.75 MG/DL (ref 0.3–1.2)
BUN BLDV-MCNC: 10 MG/DL (ref 6–20)
BUN/CREAT BLD: ABNORMAL (ref 9–20)
CALCIUM IONIZED: 1.08 MMOL/L (ref 1.13–1.33)
CALCIUM SERPL-MCNC: 7.6 MG/DL (ref 8.6–10.4)
CHLORIDE BLD-SCNC: 99 MMOL/L (ref 98–107)
CO2: 22 MMOL/L (ref 20–31)
CREAT SERPL-MCNC: 0.88 MG/DL (ref 0.7–1.2)
EKG ATRIAL RATE: 108 BPM
EKG P AXIS: 59 DEGREES
EKG P-R INTERVAL: 142 MS
EKG Q-T INTERVAL: 344 MS
EKG QRS DURATION: 86 MS
EKG QTC CALCULATION (BAZETT): 460 MS
EKG R AXIS: 47 DEGREES
EKG T AXIS: 57 DEGREES
EKG VENTRICULAR RATE: 108 BPM
FOLATE: 4.7 NG/ML
GFR AFRICAN AMERICAN: >60 ML/MIN
GFR NON-AFRICAN AMERICAN: >60 ML/MIN
GFR SERPL CREATININE-BSD FRML MDRD: ABNORMAL ML/MIN/{1.73_M2}
GFR SERPL CREATININE-BSD FRML MDRD: ABNORMAL ML/MIN/{1.73_M2}
GLUCOSE BLD-MCNC: 140 MG/DL (ref 70–99)
HCT VFR BLD CALC: 42.1 % (ref 41–53)
HEMOGLOBIN: 14.2 G/DL (ref 13.5–17.5)
INR BLD: 1
IRON SATURATION: 42 % (ref 20–55)
IRON: 59 UG/DL (ref 59–158)
LIPASE: 107 U/L (ref 13–60)
MAGNESIUM: 1.4 MG/DL (ref 1.6–2.6)
MAGNESIUM: 2.3 MG/DL (ref 1.6–2.6)
MCH RBC QN AUTO: 34 PG (ref 26–34)
MCHC RBC AUTO-ENTMCNC: 33.7 G/DL (ref 31–37)
MCV RBC AUTO: 101 FL (ref 80–100)
NRBC AUTOMATED: ABNORMAL PER 100 WBC
PDW BLD-RTO: 12.7 % (ref 12.5–15.4)
PHOSPHORUS: 1.5 MG/DL (ref 2.5–4.5)
PHOSPHORUS: 2.4 MG/DL (ref 2.5–4.5)
PLATELET # BLD: 165 K/UL (ref 140–450)
PMV BLD AUTO: 9 FL (ref 6–12)
POTASSIUM SERPL-SCNC: 4.3 MMOL/L (ref 3.7–5.3)
PROTHROMBIN TIME: 10.7 SEC (ref 9.4–12.6)
RBC # BLD: 4.17 M/UL (ref 4.5–5.9)
SODIUM BLD-SCNC: 132 MMOL/L (ref 135–144)
TOTAL IRON BINDING CAPACITY: 142 UG/DL (ref 250–450)
TOTAL PROTEIN: 6 G/DL (ref 6.4–8.3)
TRIGL SERPL-MCNC: 799 MG/DL
UNSATURATED IRON BINDING CAPACITY: 83 UG/DL (ref 112–347)
VITAMIN B-12: 749 PG/ML (ref 232–1245)
WBC # BLD: 8.7 K/UL (ref 3.5–11)

## 2021-08-12 PROCEDURE — 82330 ASSAY OF CALCIUM: CPT

## 2021-08-12 PROCEDURE — 85027 COMPLETE CBC AUTOMATED: CPT

## 2021-08-12 PROCEDURE — 83690 ASSAY OF LIPASE: CPT

## 2021-08-12 PROCEDURE — 6370000000 HC RX 637 (ALT 250 FOR IP): Performed by: STUDENT IN AN ORGANIZED HEALTH CARE EDUCATION/TRAINING PROGRAM

## 2021-08-12 PROCEDURE — 2580000003 HC RX 258: Performed by: STUDENT IN AN ORGANIZED HEALTH CARE EDUCATION/TRAINING PROGRAM

## 2021-08-12 PROCEDURE — 76705 ECHO EXAM OF ABDOMEN: CPT

## 2021-08-12 PROCEDURE — 84100 ASSAY OF PHOSPHORUS: CPT

## 2021-08-12 PROCEDURE — 85610 PROTHROMBIN TIME: CPT

## 2021-08-12 PROCEDURE — 6370000000 HC RX 637 (ALT 250 FOR IP): Performed by: NURSE PRACTITIONER

## 2021-08-12 PROCEDURE — 82746 ASSAY OF FOLIC ACID SERUM: CPT

## 2021-08-12 PROCEDURE — 83735 ASSAY OF MAGNESIUM: CPT

## 2021-08-12 PROCEDURE — 2500000003 HC RX 250 WO HCPCS: Performed by: NURSE PRACTITIONER

## 2021-08-12 PROCEDURE — 82150 ASSAY OF AMYLASE: CPT

## 2021-08-12 PROCEDURE — 2060000000 HC ICU INTERMEDIATE R&B

## 2021-08-12 PROCEDURE — 2580000003 HC RX 258: Performed by: NURSE PRACTITIONER

## 2021-08-12 PROCEDURE — 80053 COMPREHEN METABOLIC PANEL: CPT

## 2021-08-12 PROCEDURE — 2500000003 HC RX 250 WO HCPCS: Performed by: STUDENT IN AN ORGANIZED HEALTH CARE EDUCATION/TRAINING PROGRAM

## 2021-08-12 PROCEDURE — 84478 ASSAY OF TRIGLYCERIDES: CPT

## 2021-08-12 PROCEDURE — 36415 COLL VENOUS BLD VENIPUNCTURE: CPT

## 2021-08-12 PROCEDURE — 82607 VITAMIN B-12: CPT

## 2021-08-12 PROCEDURE — 6360000002 HC RX W HCPCS: Performed by: STUDENT IN AN ORGANIZED HEALTH CARE EDUCATION/TRAINING PROGRAM

## 2021-08-12 PROCEDURE — APPSS180 APP SPLIT SHARED TIME > 60 MINUTES: Performed by: NURSE PRACTITIONER

## 2021-08-12 PROCEDURE — 83550 IRON BINDING TEST: CPT

## 2021-08-12 PROCEDURE — 6360000002 HC RX W HCPCS: Performed by: NURSE PRACTITIONER

## 2021-08-12 PROCEDURE — 99222 1ST HOSP IP/OBS MODERATE 55: CPT | Performed by: STUDENT IN AN ORGANIZED HEALTH CARE EDUCATION/TRAINING PROGRAM

## 2021-08-12 PROCEDURE — 83540 ASSAY OF IRON: CPT

## 2021-08-12 RX ORDER — MAGNESIUM SULFATE IN WATER 40 MG/ML
2000 INJECTION, SOLUTION INTRAVENOUS ONCE
Status: COMPLETED | OUTPATIENT
Start: 2021-08-12 | End: 2021-08-12

## 2021-08-12 RX ORDER — SIMETHICONE 80 MG
160 TABLET,CHEWABLE ORAL EVERY 6 HOURS PRN
Status: DISCONTINUED | OUTPATIENT
Start: 2021-08-12 | End: 2021-08-14 | Stop reason: HOSPADM

## 2021-08-12 RX ORDER — DOCUSATE SODIUM 100 MG/1
100 CAPSULE, LIQUID FILLED ORAL DAILY
Status: DISCONTINUED | OUTPATIENT
Start: 2021-08-12 | End: 2021-08-14 | Stop reason: HOSPADM

## 2021-08-12 RX ORDER — PANTOPRAZOLE SODIUM 40 MG/1
40 TABLET, DELAYED RELEASE ORAL
Status: DISCONTINUED | OUTPATIENT
Start: 2021-08-12 | End: 2021-08-14 | Stop reason: HOSPADM

## 2021-08-12 RX ORDER — FOLIC ACID 1 MG/1
1 TABLET ORAL DAILY
Status: DISCONTINUED | OUTPATIENT
Start: 2021-08-12 | End: 2021-08-14 | Stop reason: HOSPADM

## 2021-08-12 RX ORDER — GAUZE BANDAGE 2" X 2"
100 BANDAGE TOPICAL DAILY
Status: DISCONTINUED | OUTPATIENT
Start: 2021-08-12 | End: 2021-08-14 | Stop reason: HOSPADM

## 2021-08-12 RX ORDER — SIMETHICONE 80 MG
80 TABLET,CHEWABLE ORAL EVERY 6 HOURS PRN
Status: DISCONTINUED | OUTPATIENT
Start: 2021-08-12 | End: 2021-08-12

## 2021-08-12 RX ORDER — HYDROCODONE BITARTRATE AND ACETAMINOPHEN 5; 325 MG/1; MG/1
1 TABLET ORAL EVERY 4 HOURS PRN
Status: DISCONTINUED | OUTPATIENT
Start: 2021-08-12 | End: 2021-08-14 | Stop reason: HOSPADM

## 2021-08-12 RX ORDER — SODIUM CHLORIDE, SODIUM LACTATE, POTASSIUM CHLORIDE, CALCIUM CHLORIDE 600; 310; 30; 20 MG/100ML; MG/100ML; MG/100ML; MG/100ML
INJECTION, SOLUTION INTRAVENOUS CONTINUOUS
Status: DISCONTINUED | OUTPATIENT
Start: 2021-08-12 | End: 2021-08-14

## 2021-08-12 RX ORDER — CLONAZEPAM 1 MG/1
1 TABLET ORAL 2 TIMES DAILY PRN
Status: DISCONTINUED | OUTPATIENT
Start: 2021-08-12 | End: 2021-08-14 | Stop reason: HOSPADM

## 2021-08-12 RX ADMIN — THIAMINE HCL TAB 100 MG 100 MG: 100 TAB at 09:06

## 2021-08-12 RX ADMIN — DOCUSATE SODIUM 100 MG: 100 CAPSULE, LIQUID FILLED ORAL at 19:01

## 2021-08-12 RX ADMIN — SIMETHICONE 80 MG: 80 TABLET, CHEWABLE ORAL at 09:06

## 2021-08-12 RX ADMIN — MAGNESIUM SULFATE HEPTAHYDRATE 2000 MG: 40 INJECTION, SOLUTION INTRAVENOUS at 11:27

## 2021-08-12 RX ADMIN — MAGNESIUM SULFATE HEPTAHYDRATE 1000 MG: 1 INJECTION, SOLUTION INTRAVENOUS at 09:19

## 2021-08-12 RX ADMIN — PANTOPRAZOLE SODIUM 40 MG: 40 TABLET, DELAYED RELEASE ORAL at 09:06

## 2021-08-12 RX ADMIN — HYDROMORPHONE HYDROCHLORIDE 0.25 MG: 1 INJECTION, SOLUTION INTRAMUSCULAR; INTRAVENOUS; SUBCUTANEOUS at 18:29

## 2021-08-12 RX ADMIN — SIMETHICONE 80 MG: 80 TABLET, CHEWABLE ORAL at 19:01

## 2021-08-12 RX ADMIN — ENOXAPARIN SODIUM 40 MG: 40 INJECTION SUBCUTANEOUS at 07:20

## 2021-08-12 RX ADMIN — MAGNESIUM SULFATE HEPTAHYDRATE 1000 MG: 1 INJECTION, SOLUTION INTRAVENOUS at 08:04

## 2021-08-12 RX ADMIN — HYDROCODONE BITARTRATE AND ACETAMINOPHEN 1 TABLET: 5; 325 TABLET ORAL at 09:27

## 2021-08-12 RX ADMIN — FOLIC ACID 1 MG: 5 INJECTION, SOLUTION INTRAMUSCULAR; INTRAVENOUS; SUBCUTANEOUS at 13:50

## 2021-08-12 RX ADMIN — FOLIC ACID 1 MG: 1 TABLET ORAL at 09:06

## 2021-08-12 RX ADMIN — HYDROCODONE BITARTRATE AND ACETAMINOPHEN 1 TABLET: 5; 325 TABLET ORAL at 13:36

## 2021-08-12 RX ADMIN — SODIUM CHLORIDE, POTASSIUM CHLORIDE, SODIUM LACTATE AND CALCIUM CHLORIDE: 600; 310; 30; 20 INJECTION, SOLUTION INTRAVENOUS at 03:27

## 2021-08-12 RX ADMIN — CLONAZEPAM 1 MG: 1 TABLET ORAL at 19:12

## 2021-08-12 RX ADMIN — SODIUM PHOSPHATE, MONOBASIC, MONOHYDRATE AND SODIUM PHOSPHATE, DIBASIC, ANHYDROUS 10 MMOL: 276; 142 INJECTION, SOLUTION INTRAVENOUS at 09:06

## 2021-08-12 RX ADMIN — SODIUM PHOSPHATE, MONOBASIC, MONOHYDRATE AND SODIUM PHOSPHATE, DIBASIC, ANHYDROUS 15 MMOL: 276; 142 INJECTION, SOLUTION INTRAVENOUS at 22:29

## 2021-08-12 RX ADMIN — HYDROMORPHONE HYDROCHLORIDE 0.25 MG: 1 INJECTION, SOLUTION INTRAMUSCULAR; INTRAVENOUS; SUBCUTANEOUS at 11:27

## 2021-08-12 RX ADMIN — ONDANSETRON 4 MG: 2 INJECTION INTRAMUSCULAR; INTRAVENOUS at 18:57

## 2021-08-12 RX ADMIN — HYDROMORPHONE HYDROCHLORIDE 0.25 MG: 1 INJECTION, SOLUTION INTRAMUSCULAR; INTRAVENOUS; SUBCUTANEOUS at 02:22

## 2021-08-12 RX ADMIN — SODIUM CHLORIDE, POTASSIUM CHLORIDE, SODIUM LACTATE AND CALCIUM CHLORIDE: 600; 310; 30; 20 INJECTION, SOLUTION INTRAVENOUS at 18:57

## 2021-08-12 RX ADMIN — SODIUM CHLORIDE: 9 INJECTION, SOLUTION INTRAVENOUS at 07:16

## 2021-08-12 RX ADMIN — SODIUM CHLORIDE, POTASSIUM CHLORIDE, SODIUM LACTATE AND CALCIUM CHLORIDE: 600; 310; 30; 20 INJECTION, SOLUTION INTRAVENOUS at 09:10

## 2021-08-12 RX ADMIN — PANTOPRAZOLE SODIUM 40 MG: 40 TABLET, DELAYED RELEASE ORAL at 15:55

## 2021-08-12 RX ADMIN — HYDROMORPHONE HYDROCHLORIDE 0.5 MG: 1 INJECTION, SOLUTION INTRAMUSCULAR; INTRAVENOUS; SUBCUTANEOUS at 07:17

## 2021-08-12 ASSESSMENT — PAIN DESCRIPTION - ONSET
ONSET: ON-GOING
ONSET: ON-GOING

## 2021-08-12 ASSESSMENT — PAIN DESCRIPTION - FREQUENCY
FREQUENCY: CONTINUOUS
FREQUENCY: CONTINUOUS

## 2021-08-12 ASSESSMENT — PAIN SCALES - GENERAL
PAINLEVEL_OUTOF10: 5
PAINLEVEL_OUTOF10: 3
PAINLEVEL_OUTOF10: 7
PAINLEVEL_OUTOF10: 5
PAINLEVEL_OUTOF10: 5
PAINLEVEL_OUTOF10: 6
PAINLEVEL_OUTOF10: 3
PAINLEVEL_OUTOF10: 3
PAINLEVEL_OUTOF10: 7
PAINLEVEL_OUTOF10: 5

## 2021-08-12 ASSESSMENT — PAIN DESCRIPTION - ORIENTATION
ORIENTATION: MID
ORIENTATION: MID

## 2021-08-12 ASSESSMENT — PAIN DESCRIPTION - PROGRESSION
CLINICAL_PROGRESSION: NOT CHANGED
CLINICAL_PROGRESSION: NOT CHANGED

## 2021-08-12 ASSESSMENT — PAIN DESCRIPTION - LOCATION
LOCATION: ABDOMEN
LOCATION: ABDOMEN

## 2021-08-12 ASSESSMENT — PAIN DESCRIPTION - PAIN TYPE
TYPE: ACUTE PAIN
TYPE: ACUTE PAIN

## 2021-08-12 ASSESSMENT — PAIN DESCRIPTION - DESCRIPTORS
DESCRIPTORS: ACHING;CONSTANT
DESCRIPTORS: ACHING

## 2021-08-12 NOTE — PROGRESS NOTES
Comprehensive Nutrition Assessment    Type and Reason for Visit:  Positive Nutrition Screen, Initial    Nutrition Recommendations/Plan:   1. Advance diet as clinically appropriate. Nutrition Assessment:  Pt at risk for nutrition compromise r/t pancreatitis. Pt reports he typically is conscious of nutrition but he does report that he ate [place] and Large french fries three times last week and drank alcohol daily. Pt noticed incredible abdominal pain on Tuesday and couldn't eat anything. Pt states he eats a high protein diet with minimal carbohydrates. RD encouraged pt to consume a more balanced diet including fruits, whole grains, and vegetables with reduction/abstinence from alcoholic beverages. Noted pt's triglycerides 799. Diet advancement to clear liquids. Malnutrition Assessment:  Malnutrition Status:  No malnutrition      Estimated Daily Nutrient Needs:  Energy (kcal):  2075-2300kcal/d; Weight Used for Energy Requirements:  Current     Protein (g):  83-100g/d; Weight Used for Protein Requirements:  Current (1.0-1.2g/kg)        Fluid (ml/day):  2075-2300mL/d; Method Used for Fluid Requirements:  1 ml/kcal      Nutrition Related Findings:  ,      Current Nutrition Therapies:    ADULT DIET; Clear Liquid    Anthropometric Measures:  · Height: 5' 9\" (175.3 cm)  · Current Body Weight: 183 lb (83 kg)   · Ideal Body Weight: 160 lbs;  · BMI: 27  · BMI Categories: Overweight (BMI 25.0-29. 9)       Nutrition Diagnosis:   · Inadequate oral intake related to altered GI function as evidenced by poor intake prior to admission, NPO or clear liquid status due to medical condition, nausea      Nutrition Interventions:   Food and/or Nutrient Delivery:  Continue Current Diet  Nutrition Education/Counseling:  No recommendation at this time, Education initiated   Coordination of Nutrition Care:  Continue to monitor while inpatient    Goals:  Adequate provision of nutrition       Nutrition Monitoring and Evaluation: Behavioral-Environmental Outcomes:      Food/Nutrient Intake Outcomes:  Diet Advancement/Tolerance  Physical Signs/Symptoms Outcomes:  Biochemical Data, GI Status, Nausea or Vomiting, Nutrition Focused Physical Findings     Discharge Planning:     Too soon to determine     Electronically signed by Kisha Short RD, LD on 8/12/21 at 2:04 PM DIANA Ngo RD, LD  Registered Dietitian  Cony Sandoval  [phone]

## 2021-08-12 NOTE — PROGRESS NOTES
Patient anxious at this time d/t being in the hospital. Patient assessed. Lungs clear. CIWAA completed and score of 6. Tiana Londono CNP, notified. New order placed (See mar).

## 2021-08-12 NOTE — CARE COORDINATION
SW met with pt in room to complete discharge planning assessment and consult. 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 He lives at home alone in an apartment. Insurance is [employer]. Discussed consult. Pt denies any issues at this time. He reports he has a beer after work but does not have any issues with alcohol. Pt denies needing anything for discharge at this time. Case Management Initial Discharge Plan  Lorenzo Aly,             Met with:Patient  Information verified: address, contacts, phone number, , insurance Yes  Insurance Provider: [employer]    Emergency Contact/Next of Hannah Kendrick name & number:Bryon [phone]  Who are involved in patient's support system? children    PCP: Shaun Campuzano PA-C  Date of last visit: 6 months ago      Discharge Planning    Living Arrangements:  [address] 1 stories  12 stairs to climb to get into front door, 0stairs to climb to reach second floor  Location of bedroom/bathroom in home:apartment, bedroom and bath connected    Patient able to perform ADL's:Independent    Current Services (outpatient & in home) none  DME equipment: none  DME provider: n/a    Is patient receiving oral anticoagulation therapy? No    If indicated:   Physician managing anticoagulation treatment: n/a  Where does patient obtain lab work for ATC treatment? n/a      Potential Assistance Needed:  N/A    Patient agreeable to home care: No  [place] of choice provided:  n/a    Prior SNF/Rehab Placement and Facility: no  Agreeable to SNF/Rehab: No  [place] of choice provided: n/a     Evaluation: yes    Expected Discharge date:  21    Patient expects to be discharged to:   home    If home: is the family and/or caregiver wiling & able to provide support at home? yes  Who will be providing this support?  Daughter will  at discharge    Follow Up Appointment: Best Day/ Time: Monday AM    Transportation provider: family  Transportation arrangements needed for discharge: No    Readmission Risk              Risk of Unplanned Readmission:  14             Does patient have a readmission risk score greater than 14?: Yes  If yes, follow-up appointment must be made within 7 days of discharge. Goals of Care: improve symptoms      Educated Marne Burkitt on transitional options, provided freedom of choice and are agreeable with plan      Discharge Plan: home independent.           Electronically signed by Glo Perera on 8/12/21 at 8:30 AM EDT

## 2021-08-12 NOTE — PLAN OF CARE
Problem: Pain:  Goal: Pain level will decrease  Description: Pain level will decrease  Outcome: Ongoing   PRN pain medications ordered (See Mar).      Problem: Physical Regulation:  Goal: Diagnostic test results will improve  Description: Diagnostic test results will improve  Outcome: Ongoing     Problem: Anxiety:  Goal: Level of anxiety will decrease  Description: Level of anxiety will decrease  Outcome: Ongoing

## 2021-08-12 NOTE — PLAN OF CARE
Nutrition Problem #1: Inadequate oral intake  Intervention: Food and/or Nutrient Delivery: Continue Current Diet  Nutritional Goals: Adequate provision of nutrition

## 2021-08-12 NOTE — H&P
[facility]  Office: [address], DO, Clay Ferris, DO, Ned Dianne, DO, Remington Hare, DO, Shasta Fernandez MD, Wesley Camejo MD, Desiree Flanagan MD, Vianey Adamson MD, Theresa Jiang MD, Julio César Plummer MD, Je Lockhart MD, Antolin Joe, DO, Heather Ny MD, Milena Ryan DO, William Sarmiento MD,  Austin Leiva DO, Amy Lewis MD, Issa Rao MD, Mary Holland MD, Divya Nation MD, Mona Dumas MD, Bertha Ray MD, Shazia Watson MD, Joao Mckeon, [facility], [address], CNP, Angelina Mtz, CNP, Alejandra Pham, [facility], Elvin Noriega, CNP, Sandra Cedeno, CNP, Cristobal Navarrete, CNP, Robby Montemayor, CNP, Dinesh Rodriguez, CNP, Ramonita Mejia PA-C, Rukhsana Lisa, [facility], Abi Strange, CNP, Franchesca Mcginnis, CNP, Asuncion Cedeño, CNP, Mart Albrecht, CNP, Marco A Alvarez, CNP, Diana Marx, CNP, Lan Tellez, CNP, Lyssa Diaz, CNP         [facility]   [address] [facility]    HISTORY AND PHYSICAL EXAMINATION            Date:   8/12/2021  Patient name:  Mila Ge  Date of admission:  8/11/2021  2:52 PM  MRN:   [id number]  Account:  [de-identified]  YOB: 1980  PCP:    Malcolm Whatley PA-C  Room:   [address] [place] [address]  Code Status:    Full Code    Chief Complaint:     Chief Complaint   Patient presents with    Abdominal Pain    Emesis       History Obtained From:     patient    History of Present Illness:     Mila Ge is a 38 y.o. Non- / non  male who presents with Abdominal Pain and Emesis   and is admitted to the hospital for the management of Alcohol-induced acute pancreatitis. Patient reported to the ER with complaints of abdominal pain with nausea, vomiting, and dry heaves that initially started on Tuesday and was pretty continuous until Wednesday. He said Wednesday the pain got so bad he just could not get comfortable.   He describes his pain as a sharp stabbing constant sensation thats primarily in the left abdomen that goes through to the left flank with some radiation into the right lower abdomen. He denies fever, chills, shortness of breath or chest pain. He reports a small bowel movement on Tuesday but he says over the last few days he really has not eaten. He reports an episode very similar to this about a month and a half ago. He said at that time he had abdominal distention/bloating with nausea and vomiting that resolved after 3 days of him laying around resting. He does admit to being a daily drinker. He says he typically has a beer or glass of wine after work. Patient states that since the loss of his wife a few years ago he has been dealing with a lot of stress. Initial sodium 125 up to 132 this morning  Ionized calcium 1.08-replacement ordered  Magnesium 1.4-replacement ordered  Phosphorus 2.4-replacement ordered  Triglycerides 799  Initial  down to 74  Bilirubin 0.96 up to 1.75  Initial lipase 253 down to 107  Initial white count 16.8 down to 8.7    He received LR at 250 mL an hour for 12 hours with transition to normal saline at 125 an hour been changed to LR at 125 per attending    Anemia labs were completed and showed a U IBC of 83, TIBC 142 and a folate of 4.7    UA is unremarkable    Gallbladder ultrasound pending    This morning he says his nausea and vomiting have improved. He said his mouth is very dry and has been tolerating ice chips throughout the night without any difficulty. He states his pain is better controlled on his current medications. He is tender to palpation primarily in the left abdomen and to the left flank. I did start the patient on clear liquids but encouraged him to go slow. I spent a lot of time with the patient discussing not drinking alcohol. I Also encouraged him to follow-up with a gastroenterologist at discharge. He tells me he has episodes of bloating/abdominal distention fairly often requiring Mylicon.   He says his PCP mentioned he may have IBS      Past Medical History:     Past Medical History:   Diagnosis Date    Anxiety     Asthma     ETOH abuse     Hypertension         Past Surgical History:     Past Surgical History:   Procedure Laterality Date    CARDIOVASCULAR STRESS TEST      > 5 yrs ago    CYST INCISION AND DRAINAGE      spider bite    KNEE SURGERY Right         Medications Prior to Admission:     Prior to Admission medications    Medication Sig Start Date End Date Taking? Authorizing Provider   irbesartan (AVAPRO) 150 MG tablet TAKE ONE TABLET BY MOUTH DAILY 6/1/21  Yes Verna Rinne, MD   Simethicone 80 MG TABS Take 80 mg by mouth every 6 hours as needed (bloating) After meals 3/4/21   Roya Velez PA-C   clonazePAM (KLONOPIN) 1 MG tablet Take 1 tablet by mouth 2 times daily as needed for Anxiety for up to 30 days. 3/4/21 4/3/21  Roya Velez PA-C   omeprazole (PRILOSEC) 20 MG delayed release capsule Take 1 capsule by mouth Daily 11/13/20   CASANDRA Worrell - CNP   albuterol sulfate HFA (PROAIR HFA) 108 (90 BASE) MCG/ACT inhaler Inhale 2 puffs into the lungs every 6 hours as needed for Wheezing 1/16/17   Roya Velez PA-C   budesonide-formoterol [facility]) 80-4.5 MCG/ACT AERO Inhale 2 puffs into the lungs 2 times daily 1/16/17   Roya Velez PA-C        Allergies: Other    Social History:     Tobacco:    reports that he has been smoking cigarettes. He has never used smokeless tobacco.  Alcohol:      reports current alcohol use. Drug Use:  reports no history of drug use. Family History:     Family History   Problem Relation Age of Onset    Other Mother         churg wayne syndrome    Emphysema Father     Cancer Maternal Uncle         abdominal area       Review of Systems:     Positive and Negative as described in HPI.     Review of Systems    Physical Exam:   /80   Pulse 97   Temp 98.7 °F (37.1 °C) (Oral)   Resp 16   Ht 5' 9\" (1.753 m)   Wt 183 lb 1.6 oz (83.1 kg)   SpO2 95%   BMI 27.04 kg/m²   Temp (24hrs), Av.4 °F (36.9 °C), Min:97.9 °F (36.6 °C), Max:98.7 °F (37.1 °C)    No results for input(s): POCGLU in the last 72 hours. Intake/Output Summary (Last 24 hours) at 2021 1111  Last data filed at 2021 0718  Gross per 24 hour   Intake 4600 ml   Output --   Net 4600 ml       Physical Exam  Vitals and nursing note reviewed. Constitutional:       Appearance: Normal appearance. HENT:      Mouth/Throat:      Mouth: Mucous membranes are dry. Eyes:      Extraocular Movements: Extraocular movements intact. Cardiovascular:      Rate and Rhythm: Normal rate and regular rhythm. Pulses: Normal pulses. Heart sounds: Normal heart sounds. Pulmonary:      Effort: Pulmonary effort is normal.      Breath sounds: Normal breath sounds. Abdominal:      General: Bowel sounds are normal.      Palpations: Abdomen is soft. Tenderness: There is abdominal tenderness. There is guarding (Left abdomen). Musculoskeletal:         General: Normal range of motion. Skin:     General: Skin is warm and dry. Capillary Refill: Capillary refill takes less than 2 seconds. Neurological:      Mental Status: He is alert and oriented to person, place, and time. GCS: GCS eye subscore is 4. GCS verbal subscore is 5. GCS motor subscore is 6.    Psychiatric:         Attention and Perception: Attention normal.         Mood and Affect: Mood normal.         Behavior: Behavior normal.         Investigations:      Laboratory Testing:  Recent Results (from the past 24 hour(s))   CBC Auto Differential    Collection Time: 21  3:00 PM   Result Value Ref Range    WBC 16.8 (H) 3.5 - 11.0 k/uL    RBC 5.26 4.5 - 5.9 m/uL    Hemoglobin 17.5 13.5 - 17.5 g/dL    Hematocrit 52.6 41 - 53 %    .1 (H) 80 - 100 fL    MCH 33.3 26 - 34 pg    MCHC 33.3 31 - 37 g/dL    RDW 12.8 12.5 - 15.4 %    Platelets 524 499 - 580 k/uL    MPV 8.9 6.0 - 12.0 fL    NRBC Automated NOT REPORTED per 100 WBC Differential Type NOT REPORTED     Seg Neutrophils 87 (H) 36 - 66 %    Lymphocytes 6 (L) 24 - 44 %    Monocytes 7 2 - 11 %    Eosinophils % 0 (L) 1 - 4 %    Basophils 0 0 - 2 %    Immature Granulocytes NOT REPORTED 0 %    Segs Absolute 14.70 (H) 1.8 - 7.7 k/uL    Absolute Lymph # 0.90 (L) 1.0 - 4.8 k/uL    Absolute Mono # 1.10 0.1 - 1.2 k/uL    Absolute Eos # 0.00 0.0 - 0.4 k/uL    Basophils Absolute 0.00 0.0 - 0.2 k/uL    Absolute Immature Granulocyte NOT REPORTED 0.00 - 0.30 k/uL    WBC Morphology NOT REPORTED     RBC Morphology NOT REPORTED     Platelet Estimate NOT REPORTED    Basic Metabolic Panel    Collection Time: 08/11/21  3:00 PM   Result Value Ref Range    Glucose 121 (H) 70 - 99 mg/dL    BUN 16 6 - 20 mg/dL    CREATININE 1.10 0.70 - 1.20 mg/dL    Bun/Cre Ratio NOT REPORTED 9 - 20    Calcium 8.6 8.6 - 10.4 mg/dL    Sodium 125 (L) 135 - 144 mmol/L    Potassium 3.9 3.7 - 5.3 mmol/L    Chloride 90 (L) 98 - 107 mmol/L    CO2 16 (L) 20 - 31 mmol/L    Anion Gap 19 (H) 9 - 17 mmol/L    GFR Non-African American >60 >60 mL/min    GFR African American >60 >60 mL/min    GFR Comment          GFR Staging NOT REPORTED    Lactic Acid    Collection Time: 08/11/21  3:00 PM   Result Value Ref Range    Lactic Acid 4.7 (H) 0.5 - 2.2 mmol/L   Hepatic Function Panel    Collection Time: 08/11/21  3:00 PM   Result Value Ref Range    Albumin 4.1 3.5 - 5.2 g/dL    Alkaline Phosphatase 114 40 - 129 U/L    ALT 98 (H) 5 - 41 U/L     (H) <40 U/L    Total Bilirubin 0.96 0.3 - 1.2 mg/dL    Bilirubin, Direct 0.46 (H) <0.31 mg/dL    Bilirubin, Indirect 0.50 0.00 - 1.00 mg/dL    Total Protein 8.0 6.4 - 8.3 g/dL    Globulin NOT REPORTED 1.5 - 3.8 g/dL    Albumin/Globulin Ratio 1.1 1.0 - 2.5   Lipase    Collection Time: 08/11/21  3:00 PM   Result Value Ref Range    Lipase 253 (H) 13 - 60 U/L   Urinalysis Reflex to Culture    Collection Time: 08/11/21  3:00 PM   Result Value Ref Range    Color, UA YELLOW YELLOW    Turbidity UA CLEAR CLEAR    Glucose, Ur NEGATIVE NEGATIVE    Bilirubin Urine NEGATIVE NEGATIVE    Ketones, Urine NEGATIVE NEGATIVE    Specific Gravity, UA 1.025 1.005 - 1.030    Urine Hgb NEGATIVE NEGATIVE    pH, UA 5.5 5.0 - 8.0    Protein, UA NEGATIVE NEGATIVE    Urobilinogen, Urine Normal Normal    Nitrite, Urine NEGATIVE NEGATIVE    Leukocyte Esterase, Urine NEGATIVE NEGATIVE    Urinalysis Comments       Microscopic exam not performed based on chemical results unless requested in original order. Urinalysis Comments          Urinalysis Comments       Utilizing a urinalysis as the only screening method to exclude a potential uropathogen can be unreliable in many patient populations. Rapid screening tests are less sensitive than culture and if UTI is a clinical possibility, culture should be considered despite a negative urinalysis.    Troponin    Collection Time: 08/11/21  3:00 PM   Result Value Ref Range    Troponin, High Sensitivity 9 0 - 22 ng/L    Troponin T NOT REPORTED <0.03 ng/mL    Troponin Interp NOT REPORTED    EKG 12 Lead    Collection Time: 08/11/21  3:52 PM   Result Value Ref Range    Ventricular Rate 108 BPM    Atrial Rate 108 BPM    P-R Interval 142 ms    QRS Duration 86 ms    Q-T Interval 344 ms    QTc Calculation (Bazett) 460 ms    P Axis 59 degrees    R Axis 47 degrees    T Axis 57 degrees   Lactic Acid    Collection Time: 08/11/21  6:34 PM   Result Value Ref Range    Lactic Acid 2.7 (H) 0.5 - 2.2 mmol/L   Comprehensive Metabolic Panel w/ Reflex to MG    Collection Time: 08/12/21  5:47 AM   Result Value Ref Range    Glucose 140 (H) 70 - 99 mg/dL    BUN 10 6 - 20 mg/dL    CREATININE 0.88 0.70 - 1.20 mg/dL    Bun/Cre Ratio NOT REPORTED 9 - 20    Calcium 7.6 (L) 8.6 - 10.4 mg/dL    Sodium 132 (L) 135 - 144 mmol/L    Potassium 4.3 3.7 - 5.3 mmol/L    Chloride 99 98 - 107 mmol/L    CO2 22 20 - 31 mmol/L    Anion Gap 11 9 - 17 mmol/L    Alkaline Phosphatase 90 40 - 129 U/L    ALT 63 (H) 5 - 41 U/L    AST 74 (H) <40 U/L    Total Bilirubin 1.75 (H) 0.3 - 1.2 mg/dL    Total Protein 6.0 (L) 6.4 - 8.3 g/dL    Albumin 3.0 (L) 3.5 - 5.2 g/dL    Albumin/Globulin Ratio 1.0 1.0 - 2.5    GFR Non-African American >60 >60 mL/min    GFR African American >60 >60 mL/min    GFR Comment          GFR Staging NOT REPORTED    Triglyceride    Collection Time: 08/12/21  5:47 AM   Result Value Ref Range    Triglycerides 799 (H) <150 mg/dL   Lipase    Collection Time: 08/12/21  5:47 AM   Result Value Ref Range    Lipase 107 (H) 13 - 60 U/L   Amylase    Collection Time: 08/12/21  5:47 AM   Result Value Ref Range    Amylase 54 28 - 100 U/L   Calcium, Ionized    Collection Time: 08/12/21  5:47 AM   Result Value Ref Range    Calcium, Ion 1.08 (L) 1.13 - 1.33 mmol/L   Magnesium    Collection Time: 08/12/21  5:47 AM   Result Value Ref Range    Magnesium 1.4 (L) 1.6 - 2.6 mg/dL   Phosphorus    Collection Time: 08/12/21  5:47 AM   Result Value Ref Range    Phosphorus 2.4 (L) 2.5 - 4.5 mg/dL   CBC    Collection Time: 08/12/21  5:47 AM   Result Value Ref Range    WBC 8.7 3.5 - 11.0 k/uL    RBC 4.17 (L) 4.5 - 5.9 m/uL    Hemoglobin 14.2 13.5 - 17.5 g/dL    Hematocrit 42.1 41 - 53 %    .0 (H) 80 - 100 fL    MCH 34.0 26 - 34 pg    MCHC 33.7 31 - 37 g/dL    RDW 12.7 12.5 - 15.4 %    Platelets 229 717 - 689 k/uL    MPV 9.0 6.0 - 12.0 fL    NRBC Automated NOT REPORTED per 100 WBC   Protime-INR    Collection Time: 08/12/21  5:47 AM   Result Value Ref Range    Protime 10.7 9.4 - 12.6 sec    INR 1.0    VITAMIN B12 & FOLATE    Collection Time: 08/12/21  5:47 AM   Result Value Ref Range    Vitamin B-12 749 232 - 1245 pg/mL    Folate 4.7 (L) >4.8 ng/mL   Iron and TIBC    Collection Time: 08/12/21  5:47 AM   Result Value Ref Range    Iron 59 59 - 158 ug/dL    TIBC 142 (L) 250 - 450 ug/dL    Iron Saturation 42 20 - 55 %    UIBC 83 (L) 112 - 347 ug/dL       Imaging/Diagnostics:    CT ABDOMEN PELVIS W IV CONTRAST Additional Contrast? None    Result Date: 8/11/2021  1. Moderate peripancreatic inflammatory change and indeterminate peripancreatic fluid, compatible with acute pancreatitis. The pancreatic tail is ill-defined, raising concern for necrosis. No encapsulated fluid collections identified. 2.  Hepatic steatosis. 3.  Prominent gastric rugal folds, suggesting gastritis. Assessment :      Hospital Problems         Last Modified POA    * (Principal) Alcohol-induced acute pancreatitis 8/12/2021 Yes    Anxiety 8/12/2021 Yes    Hyponatremia 8/12/2021 Yes    Hypomagnesemia 8/12/2021 Yes    ETOH abuse 8/12/2021 Yes          Plan:     Patient status inpatient in the Med/Surge    Alcohol induced acute pancreatitis; aggressive hydration, alcohol cessation, trialing clear liquid diet. Anxiety; home Klonopin resumed    Hyponatremia improving with fluids continue to monitor    Monitor replace electrolytes as needed    PPI and DVT prophylaxis    EtOH abuse; patient started on folic acid and thiamine. Social service consult   We spoke at length about not drinking. Patient states he only drinks a beer or 2 after work and not every day. I went on to explain it after pancreatitis it would be best for him to avoid alcohol completely and he agrees. Plan to offer resources if agreeable    Patient [facility] ambulate in the [facility]    Consultations:   IP CONSULT TO SOCIAL WORK     Patient is admitted as inpatient status because of co-morbidities listed above, severity of signs and symptoms as outlined, requirement for current medical therapies and most importantly because of direct risk to patient if care not provided in a hospital setting. Expected length of stay > 48 hours.     CASANDRA Ruth CNP  8/12/2021  11:11 AM    Copy sent to Dr. Carolyn Braswell PA-C

## 2021-08-12 NOTE — PLAN OF CARE
Problem: Pain:  Goal: Pain level will decrease  8/12/2021 0245 by Meghann Warren RN  Outcome: Ongoing   Pain scale preformed per protocol and pt treated for pain as documented. Education given. Problem: Anxiety:  Goal: Level of anxiety will decrease  8/12/2021 0245 by Meghann Warren RN  Outcome: Ongoing   PRN meds in place.  Emotional support given  Problem: Nutrition  Goal: Optimal nutrition therapy  8/12/2021 [id number] by Tommy Hackett RN  Outcome: Ongoing   PO encouraged as toelrated

## 2021-08-13 ENCOUNTER — APPOINTMENT (OUTPATIENT)
Dept: GENERAL RADIOLOGY | Age: 41
DRG: 439 | End: 2021-08-13
Payer: COMMERCIAL

## 2021-08-13 LAB
ALBUMIN SERPL-MCNC: 2.7 G/DL (ref 3.5–5.2)
ALBUMIN/GLOBULIN RATIO: 0.9 (ref 1–2.5)
ALP BLD-CCNC: 87 U/L (ref 40–129)
ALT SERPL-CCNC: 49 U/L (ref 5–41)
AMYLASE: 31 U/L (ref 28–100)
ANION GAP SERPL CALCULATED.3IONS-SCNC: 7 MMOL/L (ref 9–17)
AST SERPL-CCNC: 64 U/L
BILIRUB SERPL-MCNC: 1.94 MG/DL (ref 0.3–1.2)
BILIRUBIN DIRECT: 0.87 MG/DL
BILIRUBIN, INDIRECT: 1.07 MG/DL (ref 0–1)
BUN BLDV-MCNC: 4 MG/DL (ref 6–20)
BUN/CREAT BLD: ABNORMAL (ref 9–20)
CALCIUM SERPL-MCNC: 7.5 MG/DL (ref 8.6–10.4)
CHLORIDE BLD-SCNC: 97 MMOL/L (ref 98–107)
CHOLESTEROL/HDL RATIO: 8.3
CHOLESTEROL: 141 MG/DL
CO2: 26 MMOL/L (ref 20–31)
CREAT SERPL-MCNC: 0.67 MG/DL (ref 0.7–1.2)
GFR AFRICAN AMERICAN: >60 ML/MIN
GFR NON-AFRICAN AMERICAN: >60 ML/MIN
GFR SERPL CREATININE-BSD FRML MDRD: ABNORMAL ML/MIN/{1.73_M2}
GFR SERPL CREATININE-BSD FRML MDRD: ABNORMAL ML/MIN/{1.73_M2}
GLOBULIN: ABNORMAL G/DL (ref 1.5–3.8)
GLUCOSE BLD-MCNC: 149 MG/DL (ref 70–99)
HAV IGM SER IA-ACNC: NONREACTIVE
HCT VFR BLD CALC: 35.2 % (ref 41–53)
HDLC SERPL-MCNC: 17 MG/DL
HEMOGLOBIN: 11.9 G/DL (ref 13.5–17.5)
HEPATITIS B CORE IGM ANTIBODY: NONREACTIVE
HEPATITIS B SURFACE ANTIGEN: NONREACTIVE
HEPATITIS C ANTIBODY: NONREACTIVE
LDL CHOLESTEROL: 85 MG/DL (ref 0–130)
LIPASE: 69 U/L (ref 13–60)
MCH RBC QN AUTO: 33.5 PG (ref 26–34)
MCHC RBC AUTO-ENTMCNC: 33.8 G/DL (ref 31–37)
MCV RBC AUTO: 99.2 FL (ref 80–100)
NRBC AUTOMATED: ABNORMAL PER 100 WBC
PDW BLD-RTO: 12.7 % (ref 12.5–15.4)
PHOSPHORUS: 1.5 MG/DL (ref 2.5–4.5)
PHOSPHORUS: 1.6 MG/DL (ref 2.5–4.5)
PLATELET # BLD: 132 K/UL (ref 140–450)
PMV BLD AUTO: 9.6 FL (ref 6–12)
POTASSIUM SERPL-SCNC: 3.8 MMOL/L (ref 3.7–5.3)
RBC # BLD: 3.55 M/UL (ref 4.5–5.9)
SODIUM BLD-SCNC: 130 MMOL/L (ref 135–144)
TOTAL PROTEIN: 5.7 G/DL (ref 6.4–8.3)
TRIGL SERPL-MCNC: 197 MG/DL
VLDLC SERPL CALC-MCNC: ABNORMAL MG/DL (ref 1–30)
WBC # BLD: 7.2 K/UL (ref 3.5–11)

## 2021-08-13 PROCEDURE — 80048 BASIC METABOLIC PNL TOTAL CA: CPT

## 2021-08-13 PROCEDURE — 99232 SBSQ HOSP IP/OBS MODERATE 35: CPT | Performed by: STUDENT IN AN ORGANIZED HEALTH CARE EDUCATION/TRAINING PROGRAM

## 2021-08-13 PROCEDURE — 83690 ASSAY OF LIPASE: CPT

## 2021-08-13 PROCEDURE — 80076 HEPATIC FUNCTION PANEL: CPT

## 2021-08-13 PROCEDURE — 6370000000 HC RX 637 (ALT 250 FOR IP): Performed by: NURSE PRACTITIONER

## 2021-08-13 PROCEDURE — 80061 LIPID PANEL: CPT

## 2021-08-13 PROCEDURE — 85027 COMPLETE CBC AUTOMATED: CPT

## 2021-08-13 PROCEDURE — 2500000003 HC RX 250 WO HCPCS: Performed by: NURSE PRACTITIONER

## 2021-08-13 PROCEDURE — 2580000003 HC RX 258: Performed by: NURSE PRACTITIONER

## 2021-08-13 PROCEDURE — 84100 ASSAY OF PHOSPHORUS: CPT

## 2021-08-13 PROCEDURE — APPSS45 APP SPLIT SHARED TIME 31-45 MINUTES: Performed by: NURSE PRACTITIONER

## 2021-08-13 PROCEDURE — 6370000000 HC RX 637 (ALT 250 FOR IP): Performed by: STUDENT IN AN ORGANIZED HEALTH CARE EDUCATION/TRAINING PROGRAM

## 2021-08-13 PROCEDURE — 36415 COLL VENOUS BLD VENIPUNCTURE: CPT

## 2021-08-13 PROCEDURE — 74018 RADEX ABDOMEN 1 VIEW: CPT

## 2021-08-13 PROCEDURE — 82150 ASSAY OF AMYLASE: CPT

## 2021-08-13 PROCEDURE — 2580000003 HC RX 258: Performed by: STUDENT IN AN ORGANIZED HEALTH CARE EDUCATION/TRAINING PROGRAM

## 2021-08-13 PROCEDURE — 6360000002 HC RX W HCPCS: Performed by: NURSE PRACTITIONER

## 2021-08-13 PROCEDURE — 2060000000 HC ICU INTERMEDIATE R&B

## 2021-08-13 RX ORDER — 0.9 % SODIUM CHLORIDE 0.9 %
250 INTRAVENOUS SOLUTION INTRAVENOUS ONCE
Status: DISCONTINUED | OUTPATIENT
Start: 2021-08-13 | End: 2021-08-14 | Stop reason: HOSPADM

## 2021-08-13 RX ORDER — POLYETHYLENE GLYCOL 3350 17 G/17G
17 POWDER, FOR SOLUTION ORAL 2 TIMES DAILY
Status: DISCONTINUED | OUTPATIENT
Start: 2021-08-13 | End: 2021-08-14 | Stop reason: HOSPADM

## 2021-08-13 RX ORDER — BISACODYL 10 MG
10 SUPPOSITORY, RECTAL RECTAL ONCE
Status: COMPLETED | OUTPATIENT
Start: 2021-08-13 | End: 2021-08-13

## 2021-08-13 RX ADMIN — HYDROMORPHONE HYDROCHLORIDE 0.25 MG: 1 INJECTION, SOLUTION INTRAMUSCULAR; INTRAVENOUS; SUBCUTANEOUS at 12:26

## 2021-08-13 RX ADMIN — THIAMINE HCL TAB 100 MG 100 MG: 100 TAB at 08:45

## 2021-08-13 RX ADMIN — HYDROMORPHONE HYDROCHLORIDE 0.25 MG: 1 INJECTION, SOLUTION INTRAMUSCULAR; INTRAVENOUS; SUBCUTANEOUS at 00:25

## 2021-08-13 RX ADMIN — HYDROCODONE BITARTRATE AND ACETAMINOPHEN 1 TABLET: 5; 325 TABLET ORAL at 22:49

## 2021-08-13 RX ADMIN — FOLIC ACID 1 MG: 1 TABLET ORAL at 08:45

## 2021-08-13 RX ADMIN — CLONAZEPAM 1 MG: 1 TABLET ORAL at 15:37

## 2021-08-13 RX ADMIN — ONDANSETRON 4 MG: 2 INJECTION INTRAMUSCULAR; INTRAVENOUS at 05:20

## 2021-08-13 RX ADMIN — POLYETHYLENE GLYCOL 3350 17 G: 17 POWDER, FOR SOLUTION ORAL at 21:26

## 2021-08-13 RX ADMIN — SODIUM PHOSPHATE, MONOBASIC, MONOHYDRATE AND SODIUM PHOSPHATE, DIBASIC, ANHYDROUS 13.29 MMOL: 276; 142 INJECTION, SOLUTION INTRAVENOUS at 08:44

## 2021-08-13 RX ADMIN — BISACODYL 10 MG: 10 SUPPOSITORY RECTAL at 17:39

## 2021-08-13 RX ADMIN — SODIUM CHLORIDE, POTASSIUM CHLORIDE, SODIUM LACTATE AND CALCIUM CHLORIDE: 600; 310; 30; 20 INJECTION, SOLUTION INTRAVENOUS at 08:44

## 2021-08-13 RX ADMIN — POLYETHYLENE GLYCOL 3350 17 G: 17 POWDER, FOR SOLUTION ORAL at 12:27

## 2021-08-13 RX ADMIN — DOCUSATE SODIUM 100 MG: 100 CAPSULE, LIQUID FILLED ORAL at 08:45

## 2021-08-13 RX ADMIN — PANTOPRAZOLE SODIUM 40 MG: 40 TABLET, DELAYED RELEASE ORAL at 15:37

## 2021-08-13 RX ADMIN — PANTOPRAZOLE SODIUM 40 MG: 40 TABLET, DELAYED RELEASE ORAL at 08:45

## 2021-08-13 RX ADMIN — HYDROMORPHONE HYDROCHLORIDE 0.25 MG: 1 INJECTION, SOLUTION INTRAMUSCULAR; INTRAVENOUS; SUBCUTANEOUS at 05:20

## 2021-08-13 ASSESSMENT — PAIN SCALES - GENERAL
PAINLEVEL_OUTOF10: 3
PAINLEVEL_OUTOF10: 6
PAINLEVEL_OUTOF10: 8
PAINLEVEL_OUTOF10: 5
PAINLEVEL_OUTOF10: 6
PAINLEVEL_OUTOF10: 5

## 2021-08-13 ASSESSMENT — PAIN DESCRIPTION - ONSET: ONSET: ON-GOING

## 2021-08-13 ASSESSMENT — PAIN - FUNCTIONAL ASSESSMENT: PAIN_FUNCTIONAL_ASSESSMENT: PREVENTS OR INTERFERES SOME ACTIVE ACTIVITIES AND ADLS

## 2021-08-13 ASSESSMENT — PAIN DESCRIPTION - PAIN TYPE: TYPE: ACUTE PAIN

## 2021-08-13 ASSESSMENT — ENCOUNTER SYMPTOMS
DIARRHEA: 0
WHEEZING: 0
CHEST TIGHTNESS: 0
BLOOD IN STOOL: 0
COUGH: 0
SHORTNESS OF BREATH: 0
CONSTIPATION: 1
NAUSEA: 1
ABDOMINAL PAIN: 1
VOMITING: 1

## 2021-08-13 ASSESSMENT — PAIN DESCRIPTION - LOCATION: LOCATION: ABDOMEN

## 2021-08-13 ASSESSMENT — PAIN DESCRIPTION - FREQUENCY: FREQUENCY: CONTINUOUS

## 2021-08-13 ASSESSMENT — PAIN DESCRIPTION - ORIENTATION: ORIENTATION: MID

## 2021-08-13 ASSESSMENT — PAIN DESCRIPTION - DESCRIPTORS: DESCRIPTORS: ACHING;CONSTANT

## 2021-08-13 ASSESSMENT — PAIN DESCRIPTION - PROGRESSION: CLINICAL_PROGRESSION: GRADUALLY IMPROVING

## 2021-08-13 NOTE — PLAN OF CARE
Problem: Pain:  Goal: Pain level will decrease  Description: Pain level will decrease  Outcome: Ongoing  Goal: Control of acute pain  Description: Control of acute pain  Outcome: Ongoing  Goal: Control of chronic pain  Description: Control of chronic pain  Outcome: Ongoing     Problem: Physical Regulation:  Goal: Diagnostic test results will improve  Description: Diagnostic test results will improve  Outcome: Ongoing  Goal: Will remain free from infection  Description: Will remain free from infection  Outcome: Ongoing  Goal: Ability to maintain vital signs within normal range will improve  Description: Ability to maintain vital signs within normal range will improve  Outcome: Ongoing     Problem: Anxiety:  Goal: Level of anxiety will decrease  Description: Level of anxiety will decrease  Outcome: Ongoing     Problem: Nutrition  Goal: Optimal nutrition therapy  Outcome: Ongoing

## 2021-08-13 NOTE — PROGRESS NOTES
[facility]  Office: [address], DO, Hitesh Song, DO, Dylan Rizo, DO, Bee Khalil Blood, DO, Pablo Fuentes MD, Nelly Kolb MD, Clarissa Mccracken MD, Liban Mireles MD, Les Simpson MD, Jacinda Gloria MD, Fallon Mane MD, Uriel Esparza, DO, Deniz Leonard MD, Debra Combs, DO, Riya Sotelo MD,  Jonathan Almeida, DO, Maraí Zamora MD, Bandar Medina MD, Callie Webb MD, Chula Camp MD, Abhishek Ji MD, Reinaldo Razo MD, Bee Gonzalez MD, Leatha Espinosa, [facility], [facility] [place]), CNP, Fang Dominguez, CNP, Macario Gutierrez, CNS, [place], CNP, Gerilyn Cockayne, CNP, Chris Contreras, CNP, Naz Sutton, CNP, Camillo Schaumann, CNP, Ugo De La Cruz PA-C, Marcelo Fuentes, [facility], Amber Copeland, CNP, Christophe Bowens, [facility], Delgado Mejaí, CNP, George Doshi, CNP, Felipe Ibarra, CNP, [place], CNP, Gladies Kocher, CNP, Christi Prieto 1732    Progress Note    8/13/2021    10:17 AM    Name:   Levon Ritter  MRN:     [id number]     Acct:      [de-identified]   Room:   [address] [place] [id number] Day:  2  Admit Date:  8/11/2021  2:52 PM    PCP:   Hermelinda Billings PA-C  Code Status:  Full Code    Subjective:     C/C:   Chief Complaint   Patient presents with    Abdominal Pain    Emesis     Interval History Status: not changed. Patient reports an episode of dry heaves overnight. He said he thought he might be able to have a bowel movement but after about 45 seconds of sitting on the toilet he became very nauseated and began dry heaving for about 10 minutes. He said since that time he has felt pretty miserable. He complains of generalized abdominal pain that has not really improved since admission. He says yesterday he felt he was tolerating the clear liquids this morning he has been little hesitant about drinking anything because of the dry heaving earlier but he is not currently nauseated.   We then discussed the need for a bowel movement. He would like to avoid a suppository and thinks he can tolerate a dose of MiraLAX. Plan to recheck LFTs, amylase, lipase and CBC    Patient encouraged to ambulate in the parsons even though he does not feel well    Temp this morning 99.4 and is slightly tachycardic at 110 blood pressure is fine  Sodium 130  BUN/creatinine 4/0.67  Phosphorus 1.6-replacement ordered    Gallbladder ultrasound on 812 showed hepatomegaly suggesting steatosis       Brief History:     Yoko Landon is a 38 y.o. Non- / non  male who presents with Abdominal Pain and Emesis   and is admitted to the hospital for the management of Alcohol-induced acute pancreatitis.     Patient reported to the ER with complaints of abdominal pain with nausea, vomiting, and dry heaves that initially started on Tuesday and was pretty continuous until Wednesday. He said Wednesday the pain got so bad he just could not get comfortable. He describes his pain as a sharp stabbing constant sensation thats primarily in the left abdomen that goes through to the left flank with some radiation into the right lower abdomen. He denies fever, chills, shortness of breath or chest pain. He reports a small bowel movement on Tuesday but he says over the last few days he really has not eaten. He reports an episode very similar to this about a month and a half ago. He said at that time he had abdominal distention/bloating with nausea and vomiting that resolved after 3 days of him laying around resting. He does admit to being a daily drinker. He says he typically has a beer or glass of wine after work. Patient states that since the loss of his wife a few years ago he has been dealing with a lot of stress.     Conitnue IV hydration, try to control pain, encourage ambulation CLD. Discussed with patient about depression and anxiety, may benefit from regular follow up with PCP and possibly psychiatrist as well.  Seems motivated to do better and wants to have more regular care established since moving to [facility]. Also encouraged him to follow-up with a gastroenterologist at discharge. Review of Systems:     Review of Systems   Constitutional: Positive for appetite change. Negative for chills, diaphoresis and fever. HENT: Negative for congestion. Eyes: Negative for visual disturbance. Respiratory: Negative for cough, chest tightness, shortness of breath and wheezing. Cardiovascular: Negative for chest pain, palpitations and leg swelling. Gastrointestinal: Positive for abdominal pain, constipation, nausea and vomiting. Negative for blood in stool and diarrhea. Genitourinary: Negative for difficulty urinating. Neurological: Negative for dizziness, weakness, light-headedness, numbness and headaches. All other systems reviewed and are negative. Medications: Allergies:     Allergies   Allergen Reactions    Other      Animals- cats mostly       Current Meds:   Scheduled Meds:    polyethylene glycol  17 g Oral BID    folic acid  1 mg Oral Daily    thiamine mononitrate  100 mg Oral Daily    pantoprazole  40 mg Oral BID AC    docusate sodium  100 mg Oral Daily    sodium chloride flush  5-40 mL Intravenous 2 times per day    enoxaparin  40 mg Subcutaneous Daily    nicotine  1 patch Transdermal Daily    sodium chloride flush  5-40 mL Intravenous 2 times per day     Continuous Infusions:    lactated ringers 125 mL/hr at 08/13/21 0844    sodium chloride      sodium chloride       PRN Meds: sodium phosphate IVPB **OR** sodium phosphate IVPB, clonazePAM, HYDROcodone 5 mg - acetaminophen **OR** [DISCONTINUED] HYDROcodone 5 mg - acetaminophen, HYDROmorphone **OR** [DISCONTINUED] HYDROmorphone, simethicone, sodium chloride flush, albuterol sulfate HFA, sodium chloride flush, sodium chloride, potassium chloride, magnesium sulfate, acetaminophen, ondansetron **OR** ondansetron, sodium chloride flush, sodium chloride    Data: Past Medical History:   has a past medical history of Anxiety, Asthma, ETOH abuse, and Hypertension. Social History:   reports that he has been smoking cigarettes. He has never used smokeless tobacco. He reports current alcohol use. He reports that he does not use drugs. Family History:   Family History   Problem Relation Age of Onset    Other Mother         churg wayne syndrome    Emphysema Father     Cancer Maternal Uncle         abdominal area       Vitals:  /77   Pulse 110   Temp 99.4 °F (37.4 °C) (Oral)   Resp 16   Ht 5' 9\" (1.753 m)   Wt 183 lb 1.6 oz (83.1 kg)   SpO2 94%   BMI 27.04 kg/m²   Temp (24hrs), Av.7 °F (37.1 °C), Min:98.1 °F (36.7 °C), Max:99.4 °F (37.4 °C)    No results for input(s): POCGLU in the last 72 hours. I/O (24Hr):     Intake/Output Summary (Last 24 hours) at 2021 1017  Last data filed at 2021 0653  Gross per 24 hour   Intake 4694.1 ml   Output 1500 ml   Net 3194.1 ml       Labs:  Hematology:  Recent Labs     21  1500 21  0547   WBC 16.8* 8.7   RBC 5.26 4.17*   HGB 17.5 14.2   HCT 52.6 42.1   .1* 101.0*   MCH 33.3 34.0   MCHC 33.3 33.7   RDW 12.8 12.7    165   MPV 8.9 9.0   INR  --  1.0     Chemistry:  Recent Labs     21  1500 21  0547 21  1838 21  0610   * 132*  --  130*   K 3.9 4.3  --  3.8   CL 90* 99  --  97*   CO2 16* 22  --  26   GLUCOSE 121* 140*  --  149*   BUN 16 10  --  4*   CREATININE 1.10 0.88  --  0.67*   MG  --  1.4* 2.3  --    ANIONGAP 19* 11  --  7*   LABGLOM >60 >60  --  >60   GFRAA >60 >60  --  >60   CALCIUM 8.6 7.6*  --  7.5*   CAION  --  1.08*  --   --    PHOS  --  2.4* 1.5* 1.6*   TROPHS 9  --   --   --      Recent Labs     21  1500 21  0547   PROT 8.0 6.0*   LABALBU 4.1 3.0*   * 74*   ALT 98* 63*   ALKPHOS 114 90   BILITOT 0.96 1.75*   BILIDIR 0.46*  --    AMYLASE  --  54   LIPASE 253* 107*   TRIG  --  799*     ABG:No results found for: POCPH, PHART, Hypomagnesemia 8/12/2021 Yes    ETOH abuse 8/12/2021 Yes    Idiopathic acute pancreatitis 8/12/2021 Yes          Plan:     Alcohol induced acute pancreatitis;hydration, alcohol cessation, trialing clear liquid diet. Repeat LFTs amylase and lipase today. Repeat CBC     Anxiety; home Klonopin resumed    MiraLAX daily     Hyponatremia improving with fluids continue to monitor     Monitor replace electrolytes as needed     PPI and DVT prophylaxis     EtOH abuse; patient started on folic acid and thiamine. Social service consult   We spoke at length about not drinking. Patient states he only drinks a beer or 2 after work and not every day. I went on to explain it after pancreatitis it would be best for him to avoid alcohol completely and he agrees.   Plan to offer resources if agreeable     Patient courage ambulate in the parsons    CASANDRA Haque CNP  8/13/2021  10:17 AM

## 2021-08-13 NOTE — FLOWSHEET NOTE
Situation:  Writer provided an initial visit to Patient. Assessment:  Patient was lying in bed. He greeted writer. He shared how he was doing. He declined a visit at this time. He expressed hopes of feeling better. Intervention:  Writer introduced herself; inquired how Pt was doing; asked if Pt would like a spiritual care visit; offered words of support and encouragement. Outcome:  Patient thanked Ankush Moncada.         08/13/21 1403   Encounter Summary   Services provided to: Patient   Referral/Consult From: Rounding   Support System Unknown   Continue Visiting   (8/13/21)   Complexity of Encounter Low   Length of Encounter 15 minutes   Routine   Type Initial   Assessment Approachable   Intervention Sustaining presence/ Ministry of presence   Outcome Refused/declined;Expressed gratitude     Electronically signed by Rigoberto Gonzales, Oncology Outpatient [facility] 36, [address] [facility] Radiation Oncology  8/13/2021  2:04 PM

## 2021-08-14 VITALS
HEART RATE: 98 BPM | OXYGEN SATURATION: 96 % | BODY MASS INDEX: 27.12 KG/M2 | TEMPERATURE: 99.9 F | WEIGHT: 183.1 LBS | SYSTOLIC BLOOD PRESSURE: 135 MMHG | RESPIRATION RATE: 16 BRPM | HEIGHT: 69 IN | DIASTOLIC BLOOD PRESSURE: 85 MMHG

## 2021-08-14 LAB
ANION GAP SERPL CALCULATED.3IONS-SCNC: 7 MMOL/L (ref 9–17)
BUN BLDV-MCNC: 3 MG/DL (ref 6–20)
BUN/CREAT BLD: ABNORMAL (ref 9–20)
CALCIUM SERPL-MCNC: 8.2 MG/DL (ref 8.6–10.4)
CHLORIDE BLD-SCNC: 103 MMOL/L (ref 98–107)
CO2: 29 MMOL/L (ref 20–31)
CREAT SERPL-MCNC: 0.76 MG/DL (ref 0.7–1.2)
GFR AFRICAN AMERICAN: >60 ML/MIN
GFR NON-AFRICAN AMERICAN: >60 ML/MIN
GFR SERPL CREATININE-BSD FRML MDRD: ABNORMAL ML/MIN/{1.73_M2}
GFR SERPL CREATININE-BSD FRML MDRD: ABNORMAL ML/MIN/{1.73_M2}
GLUCOSE BLD-MCNC: 132 MG/DL (ref 70–99)
PHOSPHORUS: 3.3 MG/DL (ref 2.5–4.5)
POTASSIUM SERPL-SCNC: 4.2 MMOL/L (ref 3.7–5.3)
SODIUM BLD-SCNC: 139 MMOL/L (ref 135–144)

## 2021-08-14 PROCEDURE — 2580000003 HC RX 258: Performed by: NURSE PRACTITIONER

## 2021-08-14 PROCEDURE — 2500000003 HC RX 250 WO HCPCS: Performed by: NURSE PRACTITIONER

## 2021-08-14 PROCEDURE — 99232 SBSQ HOSP IP/OBS MODERATE 35: CPT | Performed by: STUDENT IN AN ORGANIZED HEALTH CARE EDUCATION/TRAINING PROGRAM

## 2021-08-14 PROCEDURE — 84100 ASSAY OF PHOSPHORUS: CPT

## 2021-08-14 PROCEDURE — 36415 COLL VENOUS BLD VENIPUNCTURE: CPT

## 2021-08-14 PROCEDURE — APPSS45 APP SPLIT SHARED TIME 31-45 MINUTES: Performed by: NURSE PRACTITIONER

## 2021-08-14 PROCEDURE — 6370000000 HC RX 637 (ALT 250 FOR IP): Performed by: NURSE PRACTITIONER

## 2021-08-14 PROCEDURE — 80048 BASIC METABOLIC PNL TOTAL CA: CPT

## 2021-08-14 PROCEDURE — 6370000000 HC RX 637 (ALT 250 FOR IP): Performed by: STUDENT IN AN ORGANIZED HEALTH CARE EDUCATION/TRAINING PROGRAM

## 2021-08-14 RX ORDER — POLYETHYLENE GLYCOL 3350 17 G/17G
17 POWDER, FOR SOLUTION ORAL 2 TIMES DAILY
Qty: 527 G | Refills: 1 | Status: SHIPPED | OUTPATIENT
Start: 2021-08-14 | End: 2021-09-13

## 2021-08-14 RX ORDER — HYDROCODONE BITARTRATE AND ACETAMINOPHEN 5; 325 MG/1; MG/1
1 TABLET ORAL EVERY 6 HOURS PRN
Qty: 12 TABLET | Refills: 0 | Status: SHIPPED | OUTPATIENT
Start: 2021-08-14 | End: 2021-08-17

## 2021-08-14 RX ORDER — FOLIC ACID 1 MG/1
1 TABLET ORAL DAILY
Qty: 30 TABLET | Refills: 0 | Status: SHIPPED | OUTPATIENT
Start: 2021-08-15

## 2021-08-14 RX ADMIN — PANTOPRAZOLE SODIUM 40 MG: 40 TABLET, DELAYED RELEASE ORAL at 09:40

## 2021-08-14 RX ADMIN — HYDROCODONE BITARTRATE AND ACETAMINOPHEN 1 TABLET: 5; 325 TABLET ORAL at 15:43

## 2021-08-14 RX ADMIN — THIAMINE HCL TAB 100 MG 100 MG: 100 TAB at 09:30

## 2021-08-14 RX ADMIN — SODIUM PHOSPHATE, MONOBASIC, MONOHYDRATE AND SODIUM PHOSPHATE, DIBASIC, ANHYDROUS 26.58 MMOL: 276; 142 INJECTION, SOLUTION INTRAVENOUS at 00:12

## 2021-08-14 RX ADMIN — POLYETHYLENE GLYCOL 3350 17 G: 17 POWDER, FOR SOLUTION ORAL at 09:30

## 2021-08-14 RX ADMIN — PANTOPRAZOLE SODIUM 40 MG: 40 TABLET, DELAYED RELEASE ORAL at 16:40

## 2021-08-14 RX ADMIN — DOCUSATE SODIUM 100 MG: 100 CAPSULE, LIQUID FILLED ORAL at 09:30

## 2021-08-14 RX ADMIN — FOLIC ACID 1 MG: 1 TABLET ORAL at 09:31

## 2021-08-14 RX ADMIN — SODIUM CHLORIDE, POTASSIUM CHLORIDE, SODIUM LACTATE AND CALCIUM CHLORIDE: 600; 310; 30; 20 INJECTION, SOLUTION INTRAVENOUS at 09:45

## 2021-08-14 RX ADMIN — SODIUM CHLORIDE, PRESERVATIVE FREE 10 ML: 5 INJECTION INTRAVENOUS at 09:31

## 2021-08-14 ASSESSMENT — PAIN SCALES - GENERAL
PAINLEVEL_OUTOF10: 3
PAINLEVEL_OUTOF10: 4
PAINLEVEL_OUTOF10: 4

## 2021-08-14 ASSESSMENT — ENCOUNTER SYMPTOMS
COUGH: 0
CONSTIPATION: 0
CHEST TIGHTNESS: 0
NAUSEA: 0
DIARRHEA: 0
ABDOMINAL PAIN: 1
BLOOD IN STOOL: 0
VOMITING: 0
WHEEZING: 0
SHORTNESS OF BREATH: 0

## 2021-08-14 NOTE — PLAN OF CARE
Problem: Pain:  Goal: Pain level will decrease  Description: Pain level will decrease  Outcome: Ongoing  Goal: Control of acute pain  Description: Control of acute pain  Outcome: Ongoing  Goal: Control of chronic pain  Description: Control of chronic pain  Outcome: Ongoing     Problem: Physical Regulation:  Goal: Diagnostic test results will improve  Description: Diagnostic test results will improve  Outcome: Ongoing  Goal: Will remain free from infection  Description: Will remain free from infection  Outcome: Ongoing  Goal: Ability to maintain vital signs within normal range will improve  Description: Ability to maintain vital signs within normal range will improve  Outcome: Ongoing     Problem: Nutrition  Goal: Optimal nutrition therapy  Outcome: Ongoing

## 2021-08-14 NOTE — PROGRESS NOTES
Writer noted that patient had phosphate replacement in the AM but no follow up lab was checked. Contacted NP to inquire if recheck was wanted still tonight or follow up in the AM. NP stated to check tonight. RN obtained order. Lab reading 1.5. Initiated phosphate replacement protocol with verification through pharmacy. RN spoke with Neeraj Lantigua from [address] [place] [address] Court who suggested that if protocol is not improving his labs today that it might be considered to increase replacements to twice a day or readjust scale. Will pass on to day shift.

## 2021-08-14 NOTE — PROGRESS NOTES
Pt discharged via wheelchair with all belongings, scripts and discharge paperwork. Follow up appointments and discharge instructions reviewed with pt and family member. All question answered to satisfaction.

## 2021-08-14 NOTE — DISCHARGE SUMMARY
[facility]  Office: [address], DO, Hitesh Johnsonf, DO, Dylan Rizo, DO, Bee Remi Blood, DO, Pablo Fuentes MD, Nelly Kolb MD, Clarissa Mccracken MD, Liban Mireles MD, Les Simpson MD, Jacinda Gloria MD, Fallon Mane MD, Uriel Esparza, DO, Deniz Leonard MD, Debra Combs, DO, Riya Sotelo MD,  Jonathan Almeida, DO, María Zamora MD, Bandar Medina MD, Callie Webb MD, Chula Camp MD, Abhishek Ji MD, Reinaldo Razo MD, Bee Gonzalez MD, Leatha Espinosa [facility], [facility], CNP, Fang Dominguez, CNP, Macario Gutierrez, CNS, [place], Bridgette Hamilton, CNP, Chris Contreras, CNP, Naz Sutton, CNP, Camillo Schaumann, CNP, Ugo De La Cruz PA-C, Marcelo Fuentes, [facility], Amber Loud, CNP, Christophe Bowens, [facility], Delgado Mejía, CNP, George Doshi, CNP, Felipe Ibarra, CNP, [place], CNP, Gladies Kocher, CNP, Christi Prieto 6352    Discharge Summary     Patient ID: Levon Ritter  :  1980   MRN: [id number]     ACCOUNT:  [de-identified]   Patient's PCP: Hermelinda Billings PA-C  Admit Date: 2021   Discharge Date: 2021     Length of Stay: 3  Code Status:  Full Code  Admitting Physician: Riya Sotelo MD  Discharge Physician: CASANDRA Spaulding  CNP     Active Discharge Diagnoses:     Hospital Problem Lists:  Principal Problem:    Alcohol-induced acute pancreatitis  Active Problems:    Anxiety    Hyponatremia    Hypomagnesemia    ETOH abuse    Idiopathic acute pancreatitis  Resolved Problems:    * No resolved hospital problems.  *      Admission Condition:  poor     Discharged Condition: fair    Hospital Stay:     Hospital Course:  Nathalia Rodriguez a 38 y.o. Non- / non  male who presents with Abdominal Pain and Emesis   and is admitted to the hospital for the management of Alcohol-induced acute pancreatitis.     Patient reported to the ER with complaints of abdominal pain with nausea, vomiting, and dry heaves that initially started on Tuesday and was pretty continuous until Wednesday.  He said Wednesday the pain got so bad he just could not get comfortable.  He describes his pain as a sharp stabbing constant sensation thats primarily in the left abdomen that goes through to the left flank with some radiation into the right lower abdomen.  He denies fever, chills, shortness of breath or chest pain.  He reports a small bowel movement on Tuesday but he says over the last few days he really has not eaten. Anne Monique reports an episode very similar to this about a month and a half ago. Anne Monique said at that time he had abdominal distention/bloating with nausea and vomiting that resolved after 3 days of him laying around resting. Anne Monique does admit to being a daily drinker. Anne Monique says he typically has a beer or glass of wine after work. Dede Garcia states that since the loss of his wife a few years ago he has been dealing with a lot of stress.     Symptoms improved with fluids. Tolerating soft diet.      Discussed with patient about depression and anxiety, may benefit from regular follow up with PCP and possibly psychiatrist as well. Seems motivated to do better and wants to have more regular care established since moving to [facility]. Also encouraged him to follow-up with a gastroenterologist at discharge. Patient encourage to avoid alcohol and to stay hydrated.  Stool softeners to facilitate a bm daily      Significant therapeutic interventions: see above    Significant Diagnostic Studies:   Labs / Micro:  CBC:   Lab Results   Component Value Date    WBC 7.2 08/13/2021    RBC 3.55 08/13/2021    HGB 11.9 08/13/2021    HCT 35.2 08/13/2021    MCV 99.2 08/13/2021    MCH 33.5 08/13/2021    MCHC 33.8 08/13/2021    RDW 12.7 08/13/2021     08/13/2021     BMP:    Lab Results   Component Value Date    GLUCOSE 132 08/14/2021     08/14/2021    K 4.2 08/14/2021     08/14/2021    CO2 29 08/14/2021    ANIONGAP 7 08/14/2021    BUN 3 08/14/2021    CREATININE 0.76 08/14/2021    BUNCRER NOT REPORTED 08/14/2021    CALCIUM 8.2 08/14/2021    LABGLOM >60 08/14/2021    GFRAA >60 08/14/2021    GFR      08/14/2021    GFR NOT REPORTED 08/14/2021     HFP:    Lab Results   Component Value Date    PROT 5.7 08/13/2021     CMP:    Lab Results   Component Value Date    GLUCOSE 132 08/14/2021     08/14/2021    K 4.2 08/14/2021     08/14/2021    CO2 29 08/14/2021    BUN 3 08/14/2021    CREATININE 0.76 08/14/2021    ANIONGAP 7 08/14/2021    ALKPHOS 87 08/13/2021    ALT 49 08/13/2021    AST 64 08/13/2021    BILITOT 1.94 08/13/2021    LABALBU 2.7 08/13/2021    ALBUMIN 0.9 08/13/2021    LABGLOM >60 08/14/2021    GFRAA >60 08/14/2021    GFR      08/14/2021    GFR NOT REPORTED 08/14/2021    PROT 5.7 08/13/2021    CALCIUM 8.2 08/14/2021     PT/INR:    Lab Results   Component Value Date    PROTIME 10.7 08/12/2021    INR 1.0 08/12/2021     U/A:    Lab Results   Component Value Date    COLORU YELLOW 08/11/2021    TURBIDITY CLEAR 08/11/2021    SPECGRAV 1.025 08/11/2021    HGBUR NEGATIVE 08/11/2021    PHUR 5.5 08/11/2021    PROTEINU NEGATIVE 08/11/2021    GLUCOSEU NEGATIVE 08/11/2021    KETUA NEGATIVE 08/11/2021    BILIRUBINUR NEGATIVE 08/11/2021    UROBILINOGEN Normal 08/11/2021    NITRU NEGATIVE 08/11/2021    LEUKOCYTESUR NEGATIVE 08/11/2021       Radiology:  XR ABDOMEN (KUB) (SINGLE AP VIEW)    Result Date: 8/13/2021  Diffuse prominence of the small and large bowel favoring ileus. CT ABDOMEN PELVIS W IV CONTRAST Additional Contrast? None    Result Date: 8/11/2021  1. Moderate peripancreatic inflammatory change and indeterminate peripancreatic fluid, compatible with acute pancreatitis. The pancreatic tail is ill-defined, raising concern for necrosis. No encapsulated fluid collections identified. 2.  Hepatic steatosis. 3.  Prominent gastric rugal folds, suggesting gastritis.      US GALLBLADDER RUQ    Result Date: 8/12/2021  Hepatomegaly, increased echogenicity of the hepatic parenchyma suggests steatosis The pancreas is obscured by bowel gas       Consultations:    Consults:     Final Specialist Recommendations/Findings:   IP CONSULT TO SOCIAL WORK      The patient was seen and examined on day of discharge and this discharge summary is in conjunction with any daily progress note from day of discharge. Discharge plan:     Disposition: Home    Physician Follow Up:     Kasia May MD  [id number] [facility]. [phone]      follow up to establish care    Esteban Jackson PA-C  [address]  [id number]  [phone]      follow up in 7-10 days       Requiring Further Evaluation/Follow Up POST HOSPITALIZATION/Incidental Findings: Hepatic steatosis. Diet: low fat, low cholesterol diet and encourage fluids    Activity: As tolerated  avoid alcohol and to stay hydrated. Instructions to Patient:   Stool softeners to facilitate a bm daily  Follow up with PCP in one week  Take medications as instructed  Call 911 or return to the ER if you experience a recurrence of your symptoms or start having fever, chills, chest pain or shortness of breath. Discharge Medications:      Medication List      START taking these medications    folic acid 1 MG tablet  Commonly known as: FOLVITE  Take 1 tablet by mouth daily  Start taking on: August 15, 2021     HYDROcodone-acetaminophen 5-325 MG per tablet  Commonly known as: NORCO  Take 1 tablet by mouth every 6 hours as needed for Pain for up to 3 days.   Notes to patient: Last dose given Saturday 8/14/21 at 3:43 PM / 1543 hrs     polyethylene glycol 17 g packet  Commonly known as: GLYCOLAX  Take 17 g by mouth 2 times daily        CONTINUE taking these medications    albuterol sulfate  (90 Base) MCG/ACT inhaler  Commonly known as: ProAir HFA  Inhale 2 puffs into the lungs every 6 hours as needed for Wheezing

## 2021-08-14 NOTE — PROGRESS NOTES
[facility]  Office: [address], DO, Danna Toribio, DO, Jean Li, DO, Mateo Payan Blood, DO, Michele Judd MD, Jannie Dove MD, Jimmie Larsen MD, Gerardo Avalos MD, Debra Kumar MD, Bakari Mercado MD, Radha Grant MD, Billy Mac, DO, Efraín Chino MD, Shar Perez, DO, April Montano MD,  Gamal Chavez, DO, Nasir Knox MD, Werner Oliveira MD, Mouna Brooke MD, Hope Opitz, MD, Diego Grimm MD, José Miguel Adam MD, Mauri Cordero MD, Og Zheng, [facility], [facility], CNP, Ever Reyna, CNP, Peterson Mora, CNS, Bubba Gonsales, CNP, Mariano Mohamud, CNP, Jenna Basurto, CNP, Jack Perez, CNP, Sulema Tom, CNP, Domi Bermudez PA-C, Willie Bourgeois, [facility], Socrates Morales, CNP, Destiny Mtz, CNP, Darren Strange, CNP, Polly Mandujano, CNP, Barbra Prader, CNP, Haydee Yang, CNP, Damon Mcmullen, CNP, Christi Moody 1422    Progress Note    8/14/2021    9:55 AM    Name:   Oliver Winter  MRN:     [id number]     Acct:      [de-identified]   Room:   [address] [place] [address] Day:  3  Admit Date:  8/11/2021  2:52 PM    PCP:   Lucia Gunderson PA-C  Code Status:  Full Code    Subjective:     C/C:   Chief Complaint   Patient presents with    Abdominal Pain    Emesis     Interval History Status: improving    He feels better today. He said he has had 2 or 3 episodes of explosive gas, liquid, \"air\" and some stool. He said his pain is better and feels hungry. Plan to advance diet and possibly home later today. Phos wnl today  Afebrile this morning    Gallbladder ultrasound on 812 showed hepatomegaly suggesting steatosis       Brief History:     Oliver Winter is a 38 y.o.  Non- / non  male who presents with Abdominal Pain and Emesis   and is admitted to the hospital for the management of Alcohol-induced acute pancreatitis.     Patient reported to the ER with complaints of abdominal pain with nausea, vomiting, and dry heaves that initially started on Tuesday and was pretty continuous until Wednesday. He said Wednesday the pain got so bad he just could not get comfortable. He describes his pain as a sharp stabbing constant sensation thats primarily in the left abdomen that goes through to the left flank with some radiation into the right lower abdomen. He denies fever, chills, shortness of breath or chest pain. He reports a small bowel movement on Tuesday but he says over the last few days he really has not eaten. He reports an episode very similar to this about a month and a half ago. He said at that time he had abdominal distention/bloating with nausea and vomiting that resolved after 3 days of him laying around resting. He does admit to being a daily drinker. He says he typically has a beer or glass of wine after work. Patient states that since the loss of his wife a few years ago he has been dealing with a lot of stress.     Conitnue IV hydration, advance diet as tolerated. Discussed with patient about depression and anxiety, may benefit from regular follow up with PCP and possibly psychiatrist as well. Seems motivated to do better and wants to have more regular care established since moving to [facility]. Also encouraged him to follow-up with a gastroenterologist at discharge. Plan for home later today    Review of Systems:     Review of Systems   Constitutional: Negative for appetite change, chills, diaphoresis and fever. HENT: Negative for congestion. Eyes: Negative for visual disturbance. Respiratory: Negative for cough, chest tightness, shortness of breath and wheezing. Cardiovascular: Negative for chest pain, palpitations and leg swelling. Gastrointestinal: Positive for abdominal pain (improving). Negative for blood in stool, constipation, diarrhea, nausea and vomiting. Lots of gas   Genitourinary: Negative for difficulty urinating.    Neurological: Negative for dizziness, weakness, light-headedness, numbness and headaches. All other systems reviewed and are negative. Medications: Allergies: Allergies   Allergen Reactions    Other      Animals- cats mostly       Current Meds:   Scheduled Meds:    polyethylene glycol  17 g Oral BID    sodium chloride  250 mL Intravenous Once    folic acid  1 mg Oral Daily    thiamine mononitrate  100 mg Oral Daily    pantoprazole  40 mg Oral BID AC    docusate sodium  100 mg Oral Daily    sodium chloride flush  5-40 mL Intravenous 2 times per day    enoxaparin  40 mg Subcutaneous Daily    nicotine  1 patch Transdermal Daily    sodium chloride flush  5-40 mL Intravenous 2 times per day     Continuous Infusions:    lactated ringers 75 mL/hr at 21 0945    sodium chloride      sodium chloride       PRN Meds: sodium phosphate IVPB **OR** sodium phosphate IVPB, clonazePAM, HYDROcodone 5 mg - acetaminophen **OR** [DISCONTINUED] HYDROcodone 5 mg - acetaminophen, HYDROmorphone **OR** [DISCONTINUED] HYDROmorphone, simethicone, sodium chloride flush, albuterol sulfate HFA, sodium chloride flush, sodium chloride, potassium chloride, magnesium sulfate, acetaminophen, ondansetron **OR** ondansetron, sodium chloride flush, sodium chloride    Data:     Past Medical History:   has a past medical history of Anxiety, Asthma, ETOH abuse, and Hypertension. Social History:   reports that he has been smoking cigarettes. He has never used smokeless tobacco. He reports current alcohol use. He reports that he does not use drugs.      Family History:   Family History   Problem Relation Age of Onset    Other Mother         churg wayne syndrome    Emphysema Father     Cancer Maternal Uncle         abdominal area       Vitals:  /84   Pulse 108   Temp 98.9 °F (37.2 °C) (Oral)   Resp 18   Ht 5' 9\" (1.753 m)   Wt 183 lb 1.6 oz (83.1 kg)   SpO2 97%   BMI 27.04 kg/m²   Temp (24hrs), Av.5 °F (37.5 °C), Min:98.9 °F (37.2 °C), Max:99.9 °F (37.7 °C)    No results for input(s): POCGLU in the last 72 hours. I/O (24Hr): Intake/Output Summary (Last 24 hours) at 8/14/2021 0955  Last data filed at 8/13/2021 1818  Gross per 24 hour   Intake 1895 ml   Output --   Net 1895 ml       Labs:  Hematology:  Recent Labs     08/11/21  1500 08/12/21  0547 08/13/21  0610   WBC 16.8* 8.7 7.2   RBC 5.26 4.17* 3.55*   HGB 17.5 14.2 11.9*   HCT 52.6 42.1 35.2*   .1* 101.0* 99.2   MCH 33.3 34.0 33.5   MCHC 33.3 33.7 33.8   RDW 12.8 12.7 12.7    165 132*   MPV 8.9 9.0 9.6   INR  --  1.0  --      Chemistry:  Recent Labs     08/11/21  1500 08/11/21  1500 08/12/21  0547 08/12/21  0547 08/12/21  1838 08/12/21  1838 08/13/21  0610 08/13/21  2244 08/14/21  0511 08/14/21  0746   *   < > 132*  --   --   --  130*  --  139  --    K 3.9   < > 4.3  --   --   --  3.8  --  4.2  --    CL 90*   < > 99  --   --   --  97*  --  103  --    CO2 16*   < > 22  --   --   --  26  --  29  --    GLUCOSE 121*   < > 140*  --   --   --  149*  --  132*  --    BUN 16   < > 10  --   --   --  4*  --  3*  --    CREATININE 1.10   < > 0.88  --   --   --  0.67*  --  0.76  --    MG  --   --  1.4*  --  2.3  --   --   --   --   --    ANIONGAP 19*   < > 11  --   --   --  7*  --  7*  --    LABGLOM >60   < > >60  --   --   --  >60  --  >60  --    GFRAA >60   < > >60  --   --   --  >60  --  >60  --    CALCIUM 8.6   < > 7.6*  --   --   --  7.5*  --  8.2*  --    CAION  --   --  1.08*  --   --   --   --   --   --   --    PHOS  --   --  2.4*   < > 1.5*   < > 1.6* 1.5*  --  3.3   TROPHS 9  --   --   --   --   --   --   --   --   --     < > = values in this interval not displayed.      Recent Labs     08/11/21  1500 08/12/21  0547 08/12/21  0547 08/13/21  0610   PROT 8.0 6.0*  --  5.7*   LABALBU 4.1 3.0*  --  2.7*   * 74*  --  64*   ALT 98* 63*  --  49*   ALKPHOS 114 90  --  87   BILITOT 0.96 1.75*  --  1.94*   BILIDIR 0.46*  --   --  0.87*   AMYLASE  --  54  --  31

## 2021-08-16 ENCOUNTER — CARE COORDINATION (OUTPATIENT)
Dept: CASE MANAGEMENT | Age: 41
End: 2021-08-16

## 2021-08-16 DIAGNOSIS — K85.20 ALCOHOL-INDUCED ACUTE PANCREATITIS WITHOUT INFECTION OR NECROSIS: Primary | ICD-10-CM

## 2021-08-16 NOTE — CARE COORDINATION
[facility] Transitions Initial Follow Up Call    Call within 2 business days of discharge: Yes    Patient: Emily Josue Patient : 1980   MRN: <[id number]>  Reason for Admission: Alcohol-induced acute pancreatitis  Discharge Date: 21 RARS: Readmission Risk Score: 16      Last Discharge [address] [address]       Complaint Diagnosis Description Type Department Provider    21 Abdominal Pain; Emesis Idiopathic acute pancreatitis, unspecified complication status . .. ED to Hosp-Admission (Discharged) (ADMITTED) MAIDA Muñoz MD; Manasa Ruth. .. Spoke with: Marychuy Sims states he continues to have some abdominal pain. Takes Norco PRN with effect. Denies nausea or vomiting. He does have frequent loose stools. He states the hospital gave him laxatives and now \"it's catching up to me\"  Discussed foods used to add bulk to stools. Verbalized understanding. Appetite and fluid in take is good. Pt. States he is taking all medications as ordered. 1111F medication reconciliation completed. Pt. States he is drinking non alcohol beer now. He denies need for [facility] or [facility] at this time. Discussed need for scheduling with PCP and Gastro. Offered assist with scheduling. Pt. Declines. He states he is going back to work tomorrow and will schedule the appts. Himself. Requested pt. To read his discharge summary for Dr's names and phone numbers. Will continue to follow  Transitions of Care Initial Call    Was this an external facility discharge? No Discharge Facility: n/a    Challenges to be reviewed by the provider   Additional needs identified to be addressed with provider: No  none             Method of communication with provider : none      Advance Care Planning:   Does patient have an Advance Directive: reviewed and current. Was this a readmission?  No  Patient stated reason for admission: nausea and vomiting  Patients top risk factors for readmission: medical condition-ETOH induced pancreatitis    Care Transition Nurse (CTN) contacted the patient by telephone to perform post hospital discharge assessment. Verified name and  with patient as identifiers. Provided introduction to self, and explanation of the CTN role. CTN reviewed discharge instructions, medical action plan and red flags with patient who verbalized understanding. Patient given an opportunity to ask questions and does not have any further questions or concerns at this time. Were discharge instructions available to patient? Yes. Reviewed appropriate site of care based on symptoms and resources available to patient including: PCP, Specialist, When to call 911 and [address]. The patient agrees to contact the PCP office for questions related to their healthcare. Medication reconciliation was performed with patient, who verbalizes understanding of administration of home medications. Advised obtaining a 90-day supply of all daily and as-needed medications. Covid Risk Education     Educated patient about risk for severe COVID-19 due to risk factors according to CDC guidelines. LPN CC reviewed discharge instructions, medical action plan and red flag symptoms with the patient who verbalized understanding. Discussed COVID vaccination status: Yes. Education provided on COVID-19 vaccination as appropriate. Discussed exposure protocols and quarantine with CDC Guidelines. Patient was given an opportunity to verbalize any questions and concerns and agrees to contact LPN CC or health care provider for questions related to their healthcare. Reviewed and educated patient on any new and changed medications related to discharge diagnosis. Was patient discharged with a pulse oximeter? No Discussed and confirmed pulse oximeter discharge instructions and when to notify provider or seek emergency care. LPN CC provided contact information.  Plan for follow-up call in 5-7 days based on severity of symptoms and risk factors. Plan for next call: symptom management-pain  N/V/D, self management-eliminate alcohol consumption and follow up appointment-with PCP and gastro        Facility: [facility]    Non-face-to-face services provided:  Obtained and reviewed discharge summary and/or continuity of care documents    Care Transitions 24 Hour Call    Care Transitions Interventions         Follow Up  No future appointments.     RADHA Chaudhry LPN Care Transitions Nurse   [phone]

## 2021-08-24 ENCOUNTER — CARE COORDINATION (OUTPATIENT)
Dept: CASE MANAGEMENT | Age: 41
End: 2021-08-24

## 2021-08-24 ENCOUNTER — TELEPHONE (OUTPATIENT)
Dept: PRIMARY CARE CLINIC | Age: 41
End: 2021-08-24

## 2021-08-24 NOTE — CARE COORDINATION
[facility] Transitions Follow Up Call    2021    Patient: Remonia Meigs  Patient : 1980   MRN: <[id number]>  Reason for Admission: Alcohol-induced acute pancreatitis  Discharge Date: 21 RARS: Readmission Risk Score: 16         Spoke with: Subsequent call. No answer. Left HIPPA compliant message to return call to this writer. Called to [facility] PA's office to update on pt need for appt. Office will call pt. To schedule appt. Care Transitions Subsequent and Final Call    Subsequent and Final Calls  Care Transitions Interventions  Other Interventions: Follow Up  No future appointments.     RADHA Simeon LPN Care Transitions Nurse   [phone]

## 2021-08-26 ENCOUNTER — CARE COORDINATION (OUTPATIENT)
Dept: CASE MANAGEMENT | Age: 41
End: 2021-08-26

## 2021-08-27 NOTE — TELEPHONE ENCOUNTER
Unable to do TCM but the patient can just schedule a normal OV to discuss this. I called and left the patient a VM to call back and schedule.

## 2021-12-07 ENCOUNTER — OFFICE VISIT (OUTPATIENT)
Dept: PRIMARY CARE CLINIC | Age: 41
End: 2021-12-07
Payer: COMMERCIAL

## 2021-12-07 VITALS
OXYGEN SATURATION: 100 % | BODY MASS INDEX: 29.22 KG/M2 | HEART RATE: 88 BPM | WEIGHT: 197.9 LBS | DIASTOLIC BLOOD PRESSURE: 102 MMHG | SYSTOLIC BLOOD PRESSURE: 150 MMHG

## 2021-12-07 DIAGNOSIS — Z79.899 HIGH RISK MEDICATION USE: ICD-10-CM

## 2021-12-07 DIAGNOSIS — Z79.899 MEDICATION MANAGEMENT: ICD-10-CM

## 2021-12-07 DIAGNOSIS — F41.9 ANXIETY: ICD-10-CM

## 2021-12-07 DIAGNOSIS — R14.0 BLOATING: Primary | ICD-10-CM

## 2021-12-07 DIAGNOSIS — K30 ACID INDIGESTION: ICD-10-CM

## 2021-12-07 LAB
ALCOHOL URINE: NEGATIVE
AMPHETAMINE SCREEN, URINE: NEGATIVE
BARBITURATE SCREEN, URINE: NEGATIVE
BENZODIAZEPINE SCREEN, URINE: NEGATIVE
BUPRENORPHINE URINE: NEGATIVE
COCAINE METABOLITE SCREEN URINE: NEGATIVE
FENTANYL SCREEN, URINE: NEGATIVE
GABAPENTIN SCREEN, URINE: NEGATIVE
MDMA URINE: NEGATIVE
METHADONE SCREEN, URINE: NEGATIVE
METHAMPHETAMINE, URINE: NEGATIVE
OPIATE SCREEN URINE: NEGATIVE
OXYCODONE SCREEN URINE: NEGATIVE
PHENCYCLIDINE SCREEN URINE: NEGATIVE
PROPOXYPHENE SCREEN, URINE: NEGATIVE
SYNTHETIC CANNABINOIDS(K2) SCREEN, URINE: NEGATIVE
THC SCREEN, URINE: NEGATIVE
TRAMADOL SCREEN URINE: NEGATIVE
TRICYCLIC ANTIDEPRESSANTS, UR: NEGATIVE

## 2021-12-07 PROCEDURE — 80305 DRUG TEST PRSMV DIR OPT OBS: CPT | Performed by: NURSE PRACTITIONER

## 2021-12-07 PROCEDURE — 99214 OFFICE O/P EST MOD 30 MIN: CPT | Performed by: NURSE PRACTITIONER

## 2021-12-07 RX ORDER — CLONAZEPAM 1 MG/1
1 TABLET ORAL 2 TIMES DAILY PRN
Qty: 60 TABLET | Refills: 0 | Status: SHIPPED | OUTPATIENT
Start: 2021-12-07 | End: 2022-02-24 | Stop reason: SDUPTHER

## 2021-12-07 RX ORDER — OMEPRAZOLE 20 MG/1
20 CAPSULE, DELAYED RELEASE ORAL DAILY
Qty: 90 CAPSULE | Refills: 1 | Status: SHIPPED | OUTPATIENT
Start: 2021-12-07

## 2021-12-07 ASSESSMENT — ENCOUNTER SYMPTOMS
BACK PAIN: 0
CONSTIPATION: 0
COUGH: 0
DIARRHEA: 0
SHORTNESS OF BREATH: 0

## 2021-12-07 NOTE — PROGRESS NOTES
[address] [address] PRIMARY CARE  [facility] [address]  [address] [place] [address]  Dept: [phone]    Leora Trujillo is a 42 y.o. male Established patient, who presents today for his medical conditions/complaints as noted below. Chief Complaint   Patient presents with    Medication Check    Established New Doctor     pt declines flu vaccine today. HPI:     HPI   Blood pressure elevated today  He does not take irbesartan on a regular basis - it has probably been about 1.5 weeks since he took it. He takes a fat burning in the morning. He is a  and works midnights. He only had about 2.5 hours of sleep. He is going to be redeployed to [place] in about a week for at least a month. He uses clonazepam about a couple times per week. This time of year it is worse - it is the anniversary of his wife's death. He had pancreatitis in August no problems since then. He has had a strict diet since hospitalization .   Fish, chicken, vegetable, rice    No street drugs  Reviewed prior notes None  Reviewed previous Labs, Imaging and Hospital Records 8/11/21    LDL Cholesterol (mg/dL)   Date Value   08/13/2021 85       (goal LDL is <100)   AST (U/L)   Date Value   08/13/2021 64 (H)     ALT (U/L)   Date Value   08/13/2021 49 (H)     BUN (mg/dL)   Date Value   08/14/2021 3 (L)     BP Readings from Last 3 Encounters:   12/07/21 (!) 150/102   08/14/21 135/85   03/19/20 122/74          (goal 120/80)    Past Medical History:   Diagnosis Date    Anxiety     Asthma     ETOH abuse     Hypertension       Past Surgical History:   Procedure Laterality Date    CARDIOVASCULAR STRESS TEST      > 5 yrs ago    CYST INCISION AND DRAINAGE      spider bite    KNEE SURGERY Right        Family History   Problem Relation Age of Onset    Other Mother         churg wayne syndrome    Emphysema Father     Cancer Maternal Uncle         abdominal area       Social History Tobacco Use    Smoking status: Former Smoker     Types: Cigarettes     Quit date: 2021     Years since quittin.6    Smokeless tobacco: Never Used   Substance Use Topics    Alcohol use: Yes     Comment: daily beer or wine (he says just one usually)      Current Outpatient Medications   Medication Sig Dispense Refill    clonazePAM (KLONOPIN) 1 MG tablet Take 1 tablet by mouth 2 times daily as needed for Anxiety for up to 30 days. 60 tablet 0    Simethicone 80 MG TABS Take 80 mg by mouth every 6 hours as needed (bloating) After meals 90 tablet 1    omeprazole (PRILOSEC) 20 MG delayed release capsule Take 1 capsule by mouth Daily 90 capsule 1    folic acid (FOLVITE) 1 MG tablet Take 1 tablet by mouth daily 30 tablet 0    irbesartan (AVAPRO) 150 MG tablet TAKE ONE TABLET BY MOUTH DAILY (Patient not taking: Reported on 2021) 90 tablet 0    albuterol sulfate HFA (PROAIR HFA) 108 (90 BASE) MCG/ACT inhaler Inhale 2 puffs into the lungs every 6 hours as needed for Wheezing (Patient not taking: Reported on 2021) 1 Inhaler 3    budesonide-formoterol (SYMBICORT) 80-4.5 MCG/ACT AERO Inhale 2 puffs into the lungs 2 times daily (Patient not taking: Reported on 2021) 1 Inhaler 2     No current facility-administered medications for this visit.      Allergies   Allergen Reactions    Other      Animals- cats mostly       Health Maintenance   Topic Date Due    COVID-19 Vaccine (1) Never done    HIV screen  Never done    DTaP/Tdap/Td vaccine (1 - Tdap) Never done    Diabetes screen  Never done    Flu vaccine (1) 2021    Potassium monitoring  2022    Creatinine monitoring  2022    Lipid screen  2026    Pneumococcal 0-64 years Vaccine (2 of 2 - PPSV23) 2044    Hepatitis C screen  Completed    Hepatitis A vaccine  Aged Out    Hepatitis B vaccine  Aged Out    Hib vaccine  Aged Out    Meningococcal (ACWY) vaccine  Aged Out       Subjective:      Review of Systems Constitutional: Negative for fatigue. HENT: Negative for congestion and ear discharge. Respiratory: Negative for cough and shortness of breath. Cardiovascular: Negative for chest pain and palpitations. Gastrointestinal: Negative for constipation and diarrhea. Genitourinary: Negative for difficulty urinating and dysuria. Musculoskeletal: Negative for back pain and gait problem. Skin: Negative for rash and wound. Neurological: Negative for dizziness, light-headedness and headaches. Psychiatric/Behavioral: Negative for dysphoric mood and sleep disturbance. The patient is nervous/anxious. Objective:     BP (!) 150/102   Pulse 88   Wt 197 lb 14.4 oz (89.8 kg)   SpO2 100%   BMI 29.22 kg/m²   Physical Exam  Vitals and nursing note reviewed. Constitutional:       General: He is not in acute distress. Appearance: He is well-developed. He is not ill-appearing. HENT:      Head: Normocephalic and atraumatic. Right Ear: External ear normal.      Left Ear: External ear normal.   Eyes:      General: No scleral icterus. Right eye: No discharge. Left eye: No discharge. Conjunctiva/sclera: Conjunctivae normal.      Pupils: Pupils are equal, round, and reactive to light. Neck:      Thyroid: No thyromegaly. Trachea: No tracheal deviation. Cardiovascular:      Rate and Rhythm: Normal rate and regular rhythm. Heart sounds: Normal heart sounds. Comments: No carotid bruit  Pulmonary:      Effort: Pulmonary effort is normal. No respiratory distress. Breath sounds: Normal breath sounds. No wheezing. Abdominal:      General: Bowel sounds are normal.      Palpations: Abdomen is soft. Musculoskeletal:      Right lower leg: No edema. Left lower leg: No edema. Lymphadenopathy:      Cervical: No cervical adenopathy. Skin:     General: Skin is warm. Findings: No rash.    Neurological:      Mental Status: He is alert and oriented to person, place, and time. Psychiatric:         Mood and Affect: Mood normal.         Behavior: Behavior normal.         Thought Content: Thought content normal.         Assessment/Plan:   1. Bloating  -     Simethicone 80 MG TABS; Take 80 mg by mouth every 6 hours as needed (bloating) After meals, Disp-90 tablet, R-1Normal  2. Anxiety  -     clonazePAM (KLONOPIN) 1 MG tablet; Take 1 tablet by mouth 2 times daily as needed for Anxiety for up to 30 days. , Disp-60 tablet, R-0Normal  3. Medication management  -     clonazePAM (KLONOPIN) 1 MG tablet; Take 1 tablet by mouth 2 times daily as needed for Anxiety for up to 30 days. , Disp-60 tablet, R-0Normal  4. Acid indigestion  -     omeprazole (PRILOSEC) 20 MG delayed release capsule; Take 1 capsule by mouth Daily, Disp-90 capsule, R-1Normal  5. High risk medication use  -     POCT Rapid Drug Screen     Continue clonazepam as needed  Continue exercise program  Take irbesartan daily    Return in about 3 months (around 3/7/2022) for anxiety, HTN,  and labs due. Orders Placed This Encounter   Procedures    POCT Rapid Drug Screen     Orders Placed This Encounter   Medications    clonazePAM (KLONOPIN) 1 MG tablet     Sig: Take 1 tablet by mouth 2 times daily as needed for Anxiety for up to 30 days. Dispense:  60 tablet     Refill:  0    Simethicone 80 MG TABS     Sig: Take 80 mg by mouth every 6 hours as needed (bloating) After meals     Dispense:  90 tablet     Refill:  1    omeprazole (PRILOSEC) 20 MG delayed release capsule     Sig: Take 1 capsule by mouth Daily     Dispense:  90 capsule     Refill:  1       Patient given educational materials - see patient instructions. Discussed use, benefit, and side effects of prescribed medications. All patient questions answered. Pt voiced understanding. Reviewed health maintenance. Instructed to continue current medications, diet and exercise. Patient agreed with treatment plan. Follow up as directed.      Electronically signed by CASANDRA Barnard - CNP on 12/7/2021 at 9:24 AM

## 2022-02-24 DIAGNOSIS — F41.9 ANXIETY: ICD-10-CM

## 2022-02-24 DIAGNOSIS — Z79.899 MEDICATION MANAGEMENT: ICD-10-CM

## 2022-02-24 RX ORDER — CLONAZEPAM 1 MG/1
1 TABLET ORAL 2 TIMES DAILY PRN
Qty: 60 TABLET | Refills: 0 | Status: SHIPPED | OUTPATIENT
Start: 2022-02-24 | End: 2022-03-26

## 2022-05-17 ENCOUNTER — HOSPITAL ENCOUNTER (INPATIENT)
Age: 42
LOS: 4 days | Discharge: HOME | DRG: 439 | End: 2022-05-21
Attending: STUDENT IN AN ORGANIZED HEALTH CARE EDUCATION/TRAINING PROGRAM
Payer: COMMERCIAL

## 2022-05-17 ENCOUNTER — APPOINTMENT (OUTPATIENT)
Dept: CT IMAGING | Age: 42
DRG: 439 | End: 2022-05-17
Payer: COMMERCIAL

## 2022-05-17 DIAGNOSIS — E80.6 HYPERBILIRUBINEMIA: ICD-10-CM

## 2022-05-17 DIAGNOSIS — K85.21 ALCOHOL-INDUCED ACUTE PANCREATITIS WITH UNINFECTED NECROSIS: Primary | ICD-10-CM

## 2022-05-17 PROBLEM — K85.90 ACUTE RECURRENT PANCREATITIS: Status: ACTIVE | Noted: 2022-05-17

## 2022-05-17 PROBLEM — E87.6 HYPOKALEMIA: Status: ACTIVE | Noted: 2022-05-17

## 2022-05-17 LAB
ABSOLUTE EOS #: 0.1 K/UL (ref 0–0.4)
ABSOLUTE LYMPH #: 2 K/UL (ref 1–4.8)
ABSOLUTE MONO #: 0.9 K/UL (ref 0.1–1.2)
ALBUMIN SERPL-MCNC: 4.5 G/DL (ref 3.5–5.2)
ALBUMIN/GLOBULIN RATIO: 1.2 (ref 1–2.5)
ALP BLD-CCNC: 90 U/L (ref 40–129)
ALT SERPL-CCNC: 32 U/L (ref 5–41)
ANION GAP SERPL CALCULATED.3IONS-SCNC: 13 MMOL/L (ref 9–17)
AST SERPL-CCNC: 33 U/L
BASOPHILS # BLD: 0 % (ref 0–2)
BASOPHILS ABSOLUTE: 0 K/UL (ref 0–0.2)
BILIRUB SERPL-MCNC: 1.02 MG/DL (ref 0.3–1.2)
BILIRUBIN DIRECT: 0.26 MG/DL
BILIRUBIN URINE: NEGATIVE
BILIRUBIN, INDIRECT: 0.76 MG/DL (ref 0–1)
BUN BLDV-MCNC: 15 MG/DL (ref 6–20)
CALCIUM SERPL-MCNC: 9.1 MG/DL (ref 8.6–10.4)
CHLORIDE BLD-SCNC: 94 MMOL/L (ref 98–107)
CO2: 25 MMOL/L (ref 20–31)
COLOR: YELLOW
COMMENT UA: ABNORMAL
CREAT SERPL-MCNC: 1.09 MG/DL (ref 0.7–1.2)
EOSINOPHILS RELATIVE PERCENT: 1 % (ref 1–4)
GFR AFRICAN AMERICAN: >60 ML/MIN
GFR NON-AFRICAN AMERICAN: >60 ML/MIN
GFR SERPL CREATININE-BSD FRML MDRD: ABNORMAL ML/MIN/{1.73_M2}
GLUCOSE BLD-MCNC: 154 MG/DL (ref 70–99)
GLUCOSE URINE: NEGATIVE
HCT VFR BLD CALC: 46.5 % (ref 41–53)
HEMOGLOBIN: 16 G/DL (ref 13.5–17.5)
KETONES, URINE: ABNORMAL
LEUKOCYTE ESTERASE, URINE: NEGATIVE
LIPASE: 789 U/L (ref 13–60)
LYMPHOCYTES # BLD: 17 % (ref 24–44)
MAGNESIUM: 1.6 MG/DL (ref 1.6–2.6)
MCH RBC QN AUTO: 31.3 PG (ref 26–34)
MCHC RBC AUTO-ENTMCNC: 34.4 G/DL (ref 31–37)
MCV RBC AUTO: 91.1 FL (ref 80–100)
MONOCYTES # BLD: 8 % (ref 2–11)
NITRITE, URINE: NEGATIVE
PDW BLD-RTO: 13 % (ref 12.5–15.4)
PH UA: 5.5 (ref 5–8)
PLATELET # BLD: 201 K/UL (ref 140–450)
PMV BLD AUTO: 9 FL (ref 6–12)
POTASSIUM SERPL-SCNC: 3.4 MMOL/L (ref 3.7–5.3)
PROTEIN UA: NEGATIVE
RBC # BLD: 5.1 M/UL (ref 4.5–5.9)
SEG NEUTROPHILS: 74 % (ref 36–66)
SEGMENTED NEUTROPHILS ABSOLUTE COUNT: 8.9 K/UL (ref 1.8–7.7)
SODIUM BLD-SCNC: 132 MMOL/L (ref 135–144)
SPECIFIC GRAVITY UA: 1.01 (ref 1–1.03)
TOTAL PROTEIN: 8.2 G/DL (ref 6.4–8.3)
TURBIDITY: CLEAR
URINE HGB: NEGATIVE
UROBILINOGEN, URINE: NORMAL
WBC # BLD: 11.9 K/UL (ref 3.5–11)

## 2022-05-17 PROCEDURE — 85025 COMPLETE CBC W/AUTO DIFF WBC: CPT

## 2022-05-17 PROCEDURE — 6370000000 HC RX 637 (ALT 250 FOR IP): Performed by: NURSE PRACTITIONER

## 2022-05-17 PROCEDURE — 2580000003 HC RX 258: Performed by: NURSE PRACTITIONER

## 2022-05-17 PROCEDURE — 6360000002 HC RX W HCPCS: Performed by: STUDENT IN AN ORGANIZED HEALTH CARE EDUCATION/TRAINING PROGRAM

## 2022-05-17 PROCEDURE — 74177 CT ABD & PELVIS W/CONTRAST: CPT

## 2022-05-17 PROCEDURE — 96376 TX/PRO/DX INJ SAME DRUG ADON: CPT

## 2022-05-17 PROCEDURE — 6370000000 HC RX 637 (ALT 250 FOR IP): Performed by: STUDENT IN AN ORGANIZED HEALTH CARE EDUCATION/TRAINING PROGRAM

## 2022-05-17 PROCEDURE — 83735 ASSAY OF MAGNESIUM: CPT

## 2022-05-17 PROCEDURE — 99285 EMERGENCY DEPT VISIT HI MDM: CPT

## 2022-05-17 PROCEDURE — 96361 HYDRATE IV INFUSION ADD-ON: CPT

## 2022-05-17 PROCEDURE — 1210000000 HC MED SURG R&B

## 2022-05-17 PROCEDURE — 2500000003 HC RX 250 WO HCPCS: Performed by: STUDENT IN AN ORGANIZED HEALTH CARE EDUCATION/TRAINING PROGRAM

## 2022-05-17 PROCEDURE — 36415 COLL VENOUS BLD VENIPUNCTURE: CPT

## 2022-05-17 PROCEDURE — 80048 BASIC METABOLIC PNL TOTAL CA: CPT

## 2022-05-17 PROCEDURE — 2580000003 HC RX 258: Performed by: STUDENT IN AN ORGANIZED HEALTH CARE EDUCATION/TRAINING PROGRAM

## 2022-05-17 PROCEDURE — 6360000002 HC RX W HCPCS: Performed by: NURSE PRACTITIONER

## 2022-05-17 PROCEDURE — 99222 1ST HOSP IP/OBS MODERATE 55: CPT | Performed by: STUDENT IN AN ORGANIZED HEALTH CARE EDUCATION/TRAINING PROGRAM

## 2022-05-17 PROCEDURE — A4216 STERILE WATER/SALINE, 10 ML: HCPCS | Performed by: NURSE PRACTITIONER

## 2022-05-17 PROCEDURE — 81003 URINALYSIS AUTO W/O SCOPE: CPT

## 2022-05-17 PROCEDURE — 2500000003 HC RX 250 WO HCPCS: Performed by: NURSE PRACTITIONER

## 2022-05-17 PROCEDURE — 2580000003 HC RX 258: Performed by: PHYSICIAN ASSISTANT

## 2022-05-17 PROCEDURE — 80076 HEPATIC FUNCTION PANEL: CPT

## 2022-05-17 PROCEDURE — 96374 THER/PROPH/DIAG INJ IV PUSH: CPT

## 2022-05-17 PROCEDURE — 83690 ASSAY OF LIPASE: CPT

## 2022-05-17 PROCEDURE — 96375 TX/PRO/DX INJ NEW DRUG ADDON: CPT

## 2022-05-17 PROCEDURE — 6360000002 HC RX W HCPCS: Performed by: PHYSICIAN ASSISTANT

## 2022-05-17 PROCEDURE — 6360000004 HC RX CONTRAST MEDICATION: Performed by: STUDENT IN AN ORGANIZED HEALTH CARE EDUCATION/TRAINING PROGRAM

## 2022-05-17 RX ORDER — ONDANSETRON 4 MG/1
4 TABLET, ORALLY DISINTEGRATING ORAL EVERY 8 HOURS PRN
Status: DISCONTINUED | OUTPATIENT
Start: 2022-05-17 | End: 2022-05-21 | Stop reason: HOSPADM

## 2022-05-17 RX ORDER — POLYETHYLENE GLYCOL 3350 17 G/17G
17 POWDER, FOR SOLUTION ORAL DAILY PRN
Status: DISCONTINUED | OUTPATIENT
Start: 2022-05-17 | End: 2022-05-21 | Stop reason: HOSPADM

## 2022-05-17 RX ORDER — POTASSIUM CHLORIDE 20 MEQ/1
40 TABLET, EXTENDED RELEASE ORAL PRN
Status: DISCONTINUED | OUTPATIENT
Start: 2022-05-17 | End: 2022-05-21 | Stop reason: HOSPADM

## 2022-05-17 RX ORDER — CLONAZEPAM 1 MG/1
1 TABLET ORAL 2 TIMES DAILY PRN
Status: DISCONTINUED | OUTPATIENT
Start: 2022-05-17 | End: 2022-05-18

## 2022-05-17 RX ORDER — ENOXAPARIN SODIUM 100 MG/ML
40 INJECTION SUBCUTANEOUS DAILY
Status: DISCONTINUED | OUTPATIENT
Start: 2022-05-17 | End: 2022-05-21 | Stop reason: HOSPADM

## 2022-05-17 RX ORDER — SODIUM CHLORIDE 0.9 % (FLUSH) 0.9 %
5-40 SYRINGE (ML) INJECTION EVERY 12 HOURS SCHEDULED
Status: DISCONTINUED | OUTPATIENT
Start: 2022-05-17 | End: 2022-05-21 | Stop reason: HOSPADM

## 2022-05-17 RX ORDER — 0.9 % SODIUM CHLORIDE 0.9 %
1000 INTRAVENOUS SOLUTION INTRAVENOUS ONCE
Status: COMPLETED | OUTPATIENT
Start: 2022-05-17 | End: 2022-05-17

## 2022-05-17 RX ORDER — MORPHINE SULFATE 10 MG/ML
6 INJECTION, SOLUTION INTRAMUSCULAR; INTRAVENOUS ONCE
Status: COMPLETED | OUTPATIENT
Start: 2022-05-17 | End: 2022-05-17

## 2022-05-17 RX ORDER — MORPHINE SULFATE 4 MG/ML
4 INJECTION, SOLUTION INTRAMUSCULAR; INTRAVENOUS ONCE
Status: COMPLETED | OUTPATIENT
Start: 2022-05-17 | End: 2022-05-17

## 2022-05-17 RX ORDER — ONDANSETRON 2 MG/ML
4 INJECTION INTRAMUSCULAR; INTRAVENOUS EVERY 6 HOURS PRN
Status: DISCONTINUED | OUTPATIENT
Start: 2022-05-17 | End: 2022-05-21 | Stop reason: HOSPADM

## 2022-05-17 RX ORDER — SODIUM CHLORIDE 0.9 % (FLUSH) 0.9 %
5-40 SYRINGE (ML) INJECTION PRN
Status: DISCONTINUED | OUTPATIENT
Start: 2022-05-17 | End: 2022-05-21 | Stop reason: HOSPADM

## 2022-05-17 RX ORDER — 0.9 % SODIUM CHLORIDE 0.9 %
80 INTRAVENOUS SOLUTION INTRAVENOUS ONCE
Status: DISCONTINUED | OUTPATIENT
Start: 2022-05-17 | End: 2022-05-21 | Stop reason: HOSPADM

## 2022-05-17 RX ORDER — ACETAMINOPHEN 325 MG/1
650 TABLET ORAL EVERY 6 HOURS PRN
Status: DISCONTINUED | OUTPATIENT
Start: 2022-05-17 | End: 2022-05-21 | Stop reason: HOSPADM

## 2022-05-17 RX ORDER — SODIUM CHLORIDE 9 MG/ML
INJECTION, SOLUTION INTRAVENOUS PRN
Status: DISCONTINUED | OUTPATIENT
Start: 2022-05-17 | End: 2022-05-21 | Stop reason: HOSPADM

## 2022-05-17 RX ORDER — SODIUM CHLORIDE, SODIUM LACTATE, POTASSIUM CHLORIDE, CALCIUM CHLORIDE 600; 310; 30; 20 MG/100ML; MG/100ML; MG/100ML; MG/100ML
INJECTION, SOLUTION INTRAVENOUS CONTINUOUS
Status: DISCONTINUED | OUTPATIENT
Start: 2022-05-17 | End: 2022-05-21 | Stop reason: HOSPADM

## 2022-05-17 RX ORDER — SODIUM CHLORIDE 0.9 % (FLUSH) 0.9 %
10 SYRINGE (ML) INJECTION PRN
Status: DISCONTINUED | OUTPATIENT
Start: 2022-05-17 | End: 2022-05-21 | Stop reason: HOSPADM

## 2022-05-17 RX ORDER — ONDANSETRON 2 MG/ML
4 INJECTION INTRAMUSCULAR; INTRAVENOUS ONCE
Status: COMPLETED | OUTPATIENT
Start: 2022-05-17 | End: 2022-05-17

## 2022-05-17 RX ORDER — SODIUM CHLORIDE 9 MG/ML
INJECTION, SOLUTION INTRAVENOUS CONTINUOUS
Status: DISCONTINUED | OUTPATIENT
Start: 2022-05-17 | End: 2022-05-17

## 2022-05-17 RX ORDER — ACETAMINOPHEN 650 MG/1
650 SUPPOSITORY RECTAL EVERY 6 HOURS PRN
Status: DISCONTINUED | OUTPATIENT
Start: 2022-05-17 | End: 2022-05-21 | Stop reason: HOSPADM

## 2022-05-17 RX ORDER — POTASSIUM CHLORIDE 7.45 MG/ML
10 INJECTION INTRAVENOUS PRN
Status: DISCONTINUED | OUTPATIENT
Start: 2022-05-17 | End: 2022-05-21 | Stop reason: HOSPADM

## 2022-05-17 RX ADMIN — HYDROMORPHONE HYDROCHLORIDE 1 MG: 1 INJECTION, SOLUTION INTRAMUSCULAR; INTRAVENOUS; SUBCUTANEOUS at 19:33

## 2022-05-17 RX ADMIN — ENOXAPARIN SODIUM 40 MG: 100 INJECTION SUBCUTANEOUS at 17:04

## 2022-05-17 RX ADMIN — SODIUM CHLORIDE 1000 ML: 9 INJECTION, SOLUTION INTRAVENOUS at 15:26

## 2022-05-17 RX ADMIN — MORPHINE SULFATE 6 MG: 10 INJECTION, SOLUTION INTRAMUSCULAR; INTRAVENOUS at 14:39

## 2022-05-17 RX ADMIN — Medication 80 ML: at 14:18

## 2022-05-17 RX ADMIN — SODIUM CHLORIDE 1000 ML: 9 INJECTION, SOLUTION INTRAVENOUS at 13:42

## 2022-05-17 RX ADMIN — SODIUM CHLORIDE, PRESERVATIVE FREE 10 ML: 5 INJECTION INTRAVENOUS at 14:18

## 2022-05-17 RX ADMIN — ONDANSETRON 4 MG: 2 INJECTION INTRAMUSCULAR; INTRAVENOUS at 13:43

## 2022-05-17 RX ADMIN — MORPHINE SULFATE 4 MG: 4 INJECTION INTRAVENOUS at 13:43

## 2022-05-17 RX ADMIN — POTASSIUM CHLORIDE 40 MEQ: 1500 TABLET, EXTENDED RELEASE ORAL at 19:34

## 2022-05-17 RX ADMIN — SODIUM CHLORIDE, POTASSIUM CHLORIDE, SODIUM LACTATE AND CALCIUM CHLORIDE: 600; 310; 30; 20 INJECTION, SOLUTION INTRAVENOUS at 16:31

## 2022-05-17 RX ADMIN — FOLIC ACID: 5 INJECTION, SOLUTION INTRAMUSCULAR; INTRAVENOUS; SUBCUTANEOUS at 17:46

## 2022-05-17 RX ADMIN — IOPAMIDOL 75 ML: 755 INJECTION, SOLUTION INTRAVENOUS at 14:18

## 2022-05-17 RX ADMIN — HYDROMORPHONE HYDROCHLORIDE 0.5 MG: 1 INJECTION, SOLUTION INTRAMUSCULAR; INTRAVENOUS; SUBCUTANEOUS at 17:05

## 2022-05-17 RX ADMIN — CLONAZEPAM 1 MG: 0.5 TABLET ORAL at 23:53

## 2022-05-17 RX ADMIN — SODIUM CHLORIDE, PRESERVATIVE FREE 20 MG: 5 INJECTION INTRAVENOUS at 20:55

## 2022-05-17 RX ADMIN — HYDROMORPHONE HYDROCHLORIDE 1 MG: 1 INJECTION, SOLUTION INTRAMUSCULAR; INTRAVENOUS; SUBCUTANEOUS at 22:37

## 2022-05-17 RX ADMIN — SODIUM CHLORIDE, POTASSIUM CHLORIDE, SODIUM LACTATE AND CALCIUM CHLORIDE: 600; 310; 30; 20 INJECTION, SOLUTION INTRAVENOUS at 23:39

## 2022-05-17 RX ADMIN — SODIUM CHLORIDE, PRESERVATIVE FREE 10 ML: 5 INJECTION INTRAVENOUS at 21:02

## 2022-05-17 ASSESSMENT — PAIN SCALES - GENERAL
PAINLEVEL_OUTOF10: 7
PAINLEVEL_OUTOF10: 7
PAINLEVEL_OUTOF10: 8
PAINLEVEL_OUTOF10: 9
PAINLEVEL_OUTOF10: 8
PAINLEVEL_OUTOF10: 0
PAINLEVEL_OUTOF10: 8

## 2022-05-17 ASSESSMENT — PAIN SCALES - WONG BAKER
WONGBAKER_NUMERICALRESPONSE: 8
WONGBAKER_NUMERICALRESPONSE: 8

## 2022-05-17 ASSESSMENT — PAIN DESCRIPTION - DESCRIPTORS
DESCRIPTORS: SORE
DESCRIPTORS: SHARP

## 2022-05-17 ASSESSMENT — PAIN DESCRIPTION - ONSET: ONSET: ON-GOING

## 2022-05-17 ASSESSMENT — PAIN - FUNCTIONAL ASSESSMENT
PAIN_FUNCTIONAL_ASSESSMENT: ACTIVITIES ARE NOT PREVENTED
PAIN_FUNCTIONAL_ASSESSMENT: 0-10
PAIN_FUNCTIONAL_ASSESSMENT: ACTIVITIES ARE NOT PREVENTED

## 2022-05-17 ASSESSMENT — PAIN DESCRIPTION - LOCATION
LOCATION: ABDOMEN
LOCATION: ABDOMEN

## 2022-05-17 ASSESSMENT — PAIN DESCRIPTION - FREQUENCY: FREQUENCY: CONTINUOUS

## 2022-05-17 ASSESSMENT — PAIN DESCRIPTION - ORIENTATION
ORIENTATION: LOWER
ORIENTATION: MID;LOWER

## 2022-05-17 ASSESSMENT — PAIN DESCRIPTION - PAIN TYPE: TYPE: ACUTE PAIN

## 2022-05-17 NOTE — ED PROVIDER NOTES
The patient was seen and examined by me in conjunction with the mid-level provider. I agree with his/her assessment and treatment plan. Patient is a 79-year-old male with history of pancreatitis secondary to alcohol presenting after drinking heavily a few days ago now with severe abdominal pain similar to his pancreatitis in the past.  On exam vitals are normal patient is in no acute distress. Abdomen is soft with diffuse tenderness. Lipase in 700s. CT evidence of pancreatitis with possible area of necrosis.   Patient treated with IV narcotics as well as copious fluids and admitted to hospitalist.    Marlyn Sorto DO  Emergency Medicine Physician  2:10 PM         Luiza Page DO  05/17/22 2022

## 2022-05-17 NOTE — ED NOTES
Patient arrived to ER with complaints of genre al abdominal pain with dry heaves, denies any vomiting or diarrhea. Patient states the pain began a few days ago while in [place] but the pain has worsened with no additional complaints. States has history of pancreatitis in July and states this pain feels similar.        Ivan Loyd RN  05/17/22 9828

## 2022-05-17 NOTE — LETTER
[facility]  [address] [facility] [address]  Phone: [phone]        May 19, 2022     Patient: Remonia Meigs   YOB: 1980   Date of Visit: 5/17/2022       To Whom It May Concern: The patient listed above is currently admitted to our hospital as of today's date since 5/17/22. If you have any questions or concerns, please don't hesitate to call.     Sincerely,        Dr. Sangita Linn

## 2022-05-17 NOTE — H&P
[facility]  Office: [address], DO, Norah Ewelina, DO, Keyonajere Troncoso, DO, Marcela Santol Blood, DO, Jluis Mcgovern MD, María Goldstein MD, Darryle So, MD, Indira Oneal MD, Earl Max MD, Mitul Colmenares MD, Heidi Elena MD, Ki Ayers, DO, Adelina Skinner DO, Jacklyn Mercado MD,  Ernestina Sandoval DO, Kalee Vincent MD, Los Almanza MD, Serafin Stephen MD, Yomaira Shrestha DO, Usama Love MD, Ramón Rodriguez MD, Dariel Collier MD, Marleny Molina, [facility], [facility], CNP, Anthony Clancy, CNP, Micah Jasso, CNP, Delta Navas, CNP, Nav Chakraborty, CNP, Sammie Rosas PA-C, Andre Mcgraw, DNP, Guanako Franco, CNP, Erika Hughes, CNP, Linus Yoder, CNP, Champ Valdes, CNS, Chen Gibbs, [facility], Malia Betancourt, CNP, Cait Lange, CNP, Celina Rosales, CNP         [facility]    HISTORY AND PHYSICAL EXAMINATION            Date:   5/17/2022  Patient name:  Darryl Gilford  Date of admission:  5/17/2022  1:14 PM  MRN:   [id number]  Account:  [de-identified]  YOB: 1980  PCP:    CASANDRA Ayon CNP  Room:   [address] [place] [address]  Code Status:    Full Code    Chief Complaint:     Chief Complaint   Patient presents with    Abdominal Pain     History Obtained From:     patient    History of Present Illness:     Darryl Gilford is a 42 y.o. male with a past medical history of alcohol use disorder and alcoholic pancreatitis who presented to the emergency department on 5/17/2022 complaining of nausea/vomiting and intractable abdominal pain. The patient states that his symptoms began about a week ago and have progressively worsened. He states that he has had alcoholic pancreatitis in the past and was able to quit drinking for several months but recently relapsed. In the ED, the patient was afebrile and nontoxic appearing. Lipase was found to be elevated at 789.  CT scan of the abdomen and pelvis was done and was significant for acute pancreatitis with slightly decreased enhancement of the pancreatic tail suspicious for developing necrosis. The patient is admitted to internal medicine for further management. Past Medical History:     Past Medical History:   Diagnosis Date    Anxiety     Asthma     ETOH abuse     Hypertension     Pancreatitis 07/2021        Past Surgical History:     Past Surgical History:   Procedure Laterality Date    CARDIOVASCULAR STRESS TEST      > 5 yrs ago    CYST INCISION AND DRAINAGE      spider bite    KNEE SURGERY Right         Medications Prior to Admission:     Prior to Admission medications    Medication Sig Start Date End Date Taking? Authorizing Provider   clonazePAM (KLONOPIN) 1 MG tablet Take 1 tablet by mouth 2 times daily as needed for Anxiety for up to 30 days. 2/24/22 3/26/22  CASANDRA Montgomery CNP   Simethicone 80 MG TABS Take 80 mg by mouth every 6 hours as needed (bloating) After meals 12/7/21   CASANDRA Montgomery CNP   omeprazole (PRILOSEC) 20 MG delayed release capsule Take 1 capsule by mouth Daily 12/7/21   CASANDRA Montgomery CNP   folic acid (FOLVITE) 1 MG tablet Take 1 tablet by mouth daily 8/15/21   CASANDRA Short CNP   irbesartan (AVAPRO) 150 MG tablet TAKE ONE TABLET BY MOUTH DAILY  Patient not taking: Reported on 8/16/2021 6/1/21   Johnna Llamas MD   albuterol sulfate HFA (PROAIR HFA) 108 (90 BASE) MCG/ACT inhaler Inhale 2 puffs into the lungs every 6 hours as needed for Wheezing  Patient not taking: Reported on 8/16/2021 1/16/17   Melissa Fulton PA-C   budesonide-formoterol [facility]) 80-4.5 MCG/ACT AERO Inhale 2 puffs into the lungs 2 times daily  Patient not taking: Reported on 8/16/2021 1/16/17   Melissa Fulton PA-C        Allergies: Other    Social History:     Tobacco:    reports that he quit smoking about 12 months ago. His smoking use included cigarettes.  He has never used smokeless tobacco.  Alcohol:      reports current alcohol use. Drug Use:  reports no history of drug use. Family History:     Family History   Problem Relation Age of Onset    Other Mother         churg wayne syndrome    Emphysema Father     Cancer Maternal Uncle         abdominal area       Review of Systems:     Positive and Negative as described in HPI. CONSTITUTIONAL:  negative for fevers, chills, sweats, fatigue, weight loss  HEENT:  negative for vision, hearing changes, runny nose, throat pain  RESPIRATORY:  negative for shortness of breath, cough, congestion, wheezing  CARDIOVASCULAR:  negative for chest pain, palpitations  GASTROINTESTINAL: Positive for nausea/vomiting and abdominal pain. GENITOURINARY:  negative for difficulty of urination, burning with urination, frequency   INTEGUMENT:  negative for rash, skin lesions, easy bruising   HEMATOLOGIC/LYMPHATIC:  negative for swelling/edema   ALLERGIC/IMMUNOLOGIC:  negative for urticaria , itching  ENDOCRINE:  negative increase in drinking, increase in urination, hot or cold intolerance  MUSCULOSKELETAL:  negative joint pains, muscle aches, swelling of joints  NEUROLOGICAL:  negative for headaches, dizziness, lightheadedness, numbness, pain, tingling extremities  BEHAVIOR/PSYCH:  negative for depression, anxiety    Physical Exam:   BP (!) 157/104   Pulse 84   Temp 98.6 °F (37 °C) (Oral)   Resp 18   Ht 5' 9\" (1.753 m)   Wt 195 lb (88.5 kg)   SpO2 97%   BMI 28.80 kg/m²   Temp (24hrs), Av.6 °F (37 °C), Min:98.6 °F (37 °C), Max:98.6 °F (37 °C)    No results for input(s): POCGLU in the last 72 hours.     Intake/Output Summary (Last 24 hours) at 2022 1634  Last data filed at 2022 1504  Gross per 24 hour   Intake 1366.67 ml   Output    Net 1366.67 ml       General Appearance:  alert, well appearing, and in no acute distress  Mental status: oriented to person, place, and time  Head:  normocephalic, atraumatic  Eye: no icterus, redness, pupils equal and reactive, extraocular eye movements intact, conjunctiva clear  Ear: normal external ear, no discharge, hearing intact  Nose:  no drainage noted  Mouth: mucous membranes moist  Neck: supple, no carotid bruits, thyroid not palpable  Lungs: Bilateral equal air entry, clear to ausculation, no wheezing, rales or rhonchi, normal effort  Cardiovascular: normal rate, regular rhythm, no murmur, gallop, rub  Abdomen: Epigastric tenderness appreciated.    Neurologic: There are no new focal motor or sensory deficits, normal muscle tone and bulk, no abnormal sensation, normal speech, cranial nerves II through XII grossly intact  Skin: No gross lesions, rashes, bruising or bleeding on exposed skin area  Extremities:  peripheral pulses palpable, no pedal edema or calf pain with palpation  Psych: normal affect     Investigations:      Laboratory Testing:  Recent Results (from the past 24 hour(s))   CBC with Auto Differential    Collection Time: 05/17/22  1:27 PM   Result Value Ref Range    WBC 11.9 (H) 3.5 - 11.0 k/uL    RBC 5.10 4.5 - 5.9 m/uL    Hemoglobin 16.0 13.5 - 17.5 g/dL    Hematocrit 46.5 41 - 53 %    MCV 91.1 80 - 100 fL    MCH 31.3 26 - 34 pg    MCHC 34.4 31 - 37 g/dL    RDW 13.0 12.5 - 15.4 %    Platelets 239 521 - 874 k/uL    MPV 9.0 6.0 - 12.0 fL    Seg Neutrophils 74 (H) 36 - 66 %    Lymphocytes 17 (L) 24 - 44 %    Monocytes 8 2 - 11 %    Eosinophils % 1 1 - 4 %    Basophils 0 0 - 2 %    Segs Absolute 8.90 (H) 1.8 - 7.7 k/uL    Absolute Lymph # 2.00 1.0 - 4.8 k/uL    Absolute Mono # 0.90 0.1 - 1.2 k/uL    Absolute Eos # 0.10 0.0 - 0.4 k/uL    Basophils Absolute 0.00 0.0 - 0.2 k/uL   Basic Metabolic Panel w/ Reflex to MG    Collection Time: 05/17/22  1:27 PM   Result Value Ref Range    Glucose 154 (H) 70 - 99 mg/dL    BUN 15 6 - 20 mg/dL    CREATININE 1.09 0.70 - 1.20 mg/dL    Calcium 9.1 8.6 - 10.4 mg/dL    Sodium 132 (L) 135 - 144 mmol/L    Potassium 3.4 (L) 3.7 - 5.3 mmol/L    Chloride 94 (L) 98 - 107 mmol/L    CO2 25 20 - 31 mmol/L Anion Gap 13 9 - 17 mmol/L    GFR Non-African American >60 >60 mL/min    GFR African American >60 >60 mL/min    GFR Comment         Hepatic Function Panel    Collection Time: 05/17/22  1:27 PM   Result Value Ref Range    Albumin 4.5 3.5 - 5.2 g/dL    Alkaline Phosphatase 90 40 - 129 U/L    ALT 32 5 - 41 U/L    AST 33 <40 U/L    Total Bilirubin 1.02 0.3 - 1.2 mg/dL    Bilirubin, Direct 0.26 <0.31 mg/dL    Bilirubin, Indirect 0.76 0.00 - 1.00 mg/dL    Total Protein 8.2 6.4 - 8.3 g/dL    Albumin/Globulin Ratio 1.2 1.0 - 2.5   Lipase    Collection Time: 05/17/22  1:27 PM   Result Value Ref Range    Lipase 789 (H) 13 - 60 U/L   Magnesium    Collection Time: 05/17/22  1:27 PM   Result Value Ref Range    Magnesium 1.6 1.6 - 2.6 mg/dL   Urinalysis with Reflex to Culture    Collection Time: 05/17/22  4:03 PM    Specimen: Urine   Result Value Ref Range    Color, UA Yellow Yellow    Turbidity UA Clear Clear    Glucose, Ur NEGATIVE NEGATIVE    Bilirubin Urine NEGATIVE NEGATIVE    Ketones, Urine TRACE (A) NEGATIVE    Specific Gravity, UA 1.010 1.005 - 1.030    Urine Hgb NEGATIVE NEGATIVE    pH, UA 5.5 5.0 - 8.0    Protein, UA NEGATIVE NEGATIVE    Urobilinogen, Urine Normal Normal    Nitrite, Urine NEGATIVE NEGATIVE    Leukocyte Esterase, Urine NEGATIVE NEGATIVE    Urinalysis Comments       Microscopic exam not performed based on chemical results unless requested in original order. Urinalysis Comments          Urinalysis Comments       Utilizing a urinalysis as the only screening method to exclude a potential uropathogen can be unreliable in many patient populations. Rapid screening tests are less sensitive than culture and if UTI is a clinical possibility, culture should be considered despite a negative urinalysis. Imaging/Diagnostics:  CT ABDOMEN PELVIS W IV CONTRAST Additional Contrast? None    Result Date: 5/17/2022  Acute pancreatitis.  Slightly decreased enhancement of the pancreatic tail, suspicious for developing necrosis. Assessment :      Hospital Problems           Last Modified POA    * (Principal) Acute recurrent pancreatitis 5/17/2022 Yes    Hypokalemia 5/17/2022 Yes    ETOH abuse 5/17/2022 Yes          Plan:     Patient status inpatient in the Progressive Unit/Step down    Acute pancreatitis   -Secondary to alcohol abuse   -Maintenance fluids at 200 mL/hr   -Dilaudid pain panel   -Diet NPO   -Daily CBC   -Daily BMP     Alcohol use disorder   -Patient states that he drinks 6-7 beers daily   -Counseled on cessation   -Daily thiamine infusion   -Monitor for signs and symptoms of withdrawal     Hyponatremia   -Secondary to SIADH from pain versus beer potomania   -Daily BMP as above     Hypokalemia   -Replacement per protocol     Consultations:   None    Patient is admitted as inpatient status because of co-morbidities listed above, severity of signs and symptoms as outlined, requirement for current medical therapies and most importantly because of direct risk to patient if care not provided in a hospital setting. Expected length of stay > 48 hours.     Socorro Issa MD  5/17/2022  4:34 PM    Copy sent to Dr. Sara Vogt, APRN - CNP

## 2022-05-17 NOTE — PLAN OF CARE
Problem: Discharge Planning  Goal: Discharge to home or other facility with appropriate resources  Outcome: Progressing  Flowsheets (Taken 5/17/2022 1640)  Discharge to home or other facility with appropriate resources:   Identify barriers to discharge with patient and caregiver   Arrange for needed discharge resources and transportation as appropriate   Identify discharge learning needs (meds, wound care, etc)     Problem: Pain  Goal: Verbalizes/displays adequate comfort level or baseline comfort level  Outcome: Progressing     Problem: ABCDS Injury Assessment  Goal: Absence of physical injury  Outcome: Progressing

## 2022-05-17 NOTE — ED PROVIDER NOTES
[facility] Med Surg ICU  [address] [facility] [address] [facility] [address]  Phone: [phone]        Pt Name: Edilma Kumar  MRN: [id number]  Eleanorgfsonya 1980  Date of evaluation: 5/17/22    [address] [place] [address]       Chief Complaint   Patient presents with    Abdominal Pain       HISTORY OF PRESENT ILLNESS (Location/Symptom, Timing/Onset, Context/Setting, Quality, Duration, Modifying Factors, Severity)      Edilma Kumar is a 42 y.o. male with pertinent PMH with history of pancreatitis who presents to the ED via private auto with abdominal pain, nausea, dry heaves. Patient reports that approximately 2 weeks ago he began experiencing generalized, but more upper abdominal pain while in [place] for work. He reports that the abdominal pain improved and then recurred severely yesterday and has been constant since then. He reports associated nausea and dry heaves. Does have a history of pancreatitis and does feel that this is similar. He has not been able to eat or drink due to his symptoms. Denies any other exacerbating relieving factors. He has not taken any medication for symptoms. He does report a history of significant alcohol use that he notes he did stop after his last bout of pancreatitis however due to the stresses of work and life, he did drink a couple beers daily whereas prior to that he was only drinking on the weekends and would otherwise drink nonalcoholic beers. Denies any fever, chills, URI symptoms, dysuria, hematuria, change in bowel habits, diarrhea, constipation, hematochezia, melena, hematemesis, lightheadedness, dizziness, syncope, chest pain, shortness of breath, or any other concerns at this time. PAST MEDICAL / SURGICAL / SOCIAL / FAMILY HISTORY     PMH:  has a past medical history of Anxiety, Asthma, ETOH abuse, Hypertension, and Pancreatitis.   Surgical History:  has a past surgical history that includes cyst incision and drainage; knee surgery (Right); and See HPI. : See HPI. Musculoskeletal: Denies recent trauma. Skin: Denies new rashes or wounds. Neurologic:  Denies new numbness or weakness. Psychiatric: Denies sleep disturbances. Endocrine:  Denies unexpected weight loss    All other systems negative except as marked. PHYSICAL EXAM  (up to 7 for level 4, 8 or more for level 5)      INITIAL VITALS:  height is 5' 9\" (1.753 m) and weight is 88.5 kg (195 lb). His oral temperature is 98.6 °F (37 °C). His blood pressure is 157/104 (abnormal) and his pulse is 84. His respiration is 18 and oxygen saturation is 97%. Vital signs reviewed. Physical Exam    General:  Alert, cooperative, well-groomed, well-nourished, appears stated age, and is in no acute distress. Head:  Normocephalic, atraumatic, and without obvious abnormality. Eyes:  Sclerae/conjunctivae clear without injection, pallor, or icterus. Corneas clear without opacities. EOM's intact. ENT: Ears and nose are all without obvious masses lesion or deformity. No oropharynx examination performed due to aerosolization risk during COVID-19 pandemic. Neck: Supple and symmetrical. Trachea midline. No adenopathy. No jugular venous distention. Lungs:   No respiratory distress. Clear to auscultation bilaterally. No wheezes, rhonchi, or rales. Heart:  Regular rate. Regular rhythm. No murmurs, rubs, or gallops. Abdomen:   Normoactive bowel sounds. There is diffuse tenderness to palpation with some mild guarding. No rebound tenderness. Tenderness is more to the upper quadrants versus lower quadrants. No palpable masses. No CVA tenderness. Extremities: Warm and dry without erythema or edema. Skin: Soft, good turgor, and well-hydrated. No obvious rashes or lesions. Neurologic: GCS is 15 and no focal deficits are appreciated. Normal gait. Grossly normal motor and sensation. Speech clear. Psychiatric: Normal mood and affect. Normal behavior. Coherent thought process.      DIFFERENTIAL Indication of Use     Answer:   Prophylaxis-DVT/PE    OR Linked Order Group     ondansetron (ZOFRAN-ODT) disintegrating tablet 4 mg     ondansetron (ZOFRAN) injection 4 mg    polyethylene glycol (GLYCOLAX) packet 17 g    OR Linked Order Group     acetaminophen (TYLENOL) tablet 650 mg     acetaminophen (TYLENOL) suppository 650 mg    OR Linked Order Group     potassium chloride (KLOR-CON M) extended release tablet 40 mEq     potassium bicarb-citric acid (EFFER-K) effervescent tablet 40 mEq     potassium chloride 10 mEq/100 mL IVPB (Peripheral Line)    HYDROmorphone (DILAUDID) injection 0.5 mg    0.9 % sodium chloride infusion       Controlled Substances Monitoring:     DIAGNOSTIC RESULTS     RADIOLOGY: All images are read by the radiologist and their interpretations are reviewed. CT ABDOMEN PELVIS W IV CONTRAST Additional Contrast? None    Result Date: 5/17/2022  EXAMINATION: CT OF THE ABDOMEN AND PELVIS WITH CONTRAST 5/17/2022 1:41 pm TECHNIQUE: CT of the abdomen and pelvis was performed with the administration of intravenous contrast. Multiplanar reformatted images are provided for review. Automated exposure control, iterative reconstruction, and/or weight based adjustment of the mA/kV was utilized to reduce the radiation dose to as low as reasonably achievable. COMPARISON: 08/11/2021 HISTORY: ORDERING SYSTEM PROVIDED HISTORY: abdominal pain TECHNOLOGIST PROVIDED HISTORY: abdominal pain Decision Support Exception - unselect if not a suspected or confirmed emergency medical condition->Emergency Medical Condition (MA) Reason for Exam: abdominal pain FINDINGS: Lower Chest: Mild bibasilar dependent atelectasis. Organs: Diffuse hepatic steatosis. Unremarkable spleen, adrenals, gallbladder, and bilateral kidneys. Marked fat stranding and ill-defined fluid around the entire pancreas, consistent with acute pancreatitis.  Slightly decreased enhancement of the pancreatic tail, suspicious for developing necrosis. No discrete fluid collection to suggest an abscess or pseudocyst. GI/Bowel: Normal appendix. Bowel loops nonobstructed. Pelvis: Prostate normal in size. Urinary bladder grossly unremarkable. Small fat containing inguinal hernias bilaterally. Peritoneum/Retroperitoneum: No free air or free fluid. No adenopathy. Intact abdominal aorta and its major branches. Bones/Soft Tissues: No acute osseous abnormality. Mild multilevel thoracolumbar spondylosis. Small posterior disc bulges in the lumbar spine at multiple levels. Acute pancreatitis. Slightly decreased enhancement of the pancreatic tail, suspicious for developing necrosis.        LABS:  Results for orders placed or performed during the hospital encounter of 05/17/22   CBC with Auto Differential   Result Value Ref Range    WBC 11.9 (H) 3.5 - 11.0 k/uL    RBC 5.10 4.5 - 5.9 m/uL    Hemoglobin 16.0 13.5 - 17.5 g/dL    Hematocrit 46.5 41 - 53 %    MCV 91.1 80 - 100 fL    MCH 31.3 26 - 34 pg    MCHC 34.4 31 - 37 g/dL    RDW 13.0 12.5 - 15.4 %    Platelets 206 113 - 896 k/uL    MPV 9.0 6.0 - 12.0 fL    Seg Neutrophils 74 (H) 36 - 66 %    Lymphocytes 17 (L) 24 - 44 %    Monocytes 8 2 - 11 %    Eosinophils % 1 1 - 4 %    Basophils 0 0 - 2 %    Segs Absolute 8.90 (H) 1.8 - 7.7 k/uL    Absolute Lymph # 2.00 1.0 - 4.8 k/uL    Absolute Mono # 0.90 0.1 - 1.2 k/uL    Absolute Eos # 0.10 0.0 - 0.4 k/uL    Basophils Absolute 0.00 0.0 - 0.2 k/uL   Basic Metabolic Panel w/ Reflex to MG   Result Value Ref Range    Glucose 154 (H) 70 - 99 mg/dL    BUN 15 6 - 20 mg/dL    CREATININE 1.09 0.70 - 1.20 mg/dL    Calcium 9.1 8.6 - 10.4 mg/dL    Sodium 132 (L) 135 - 144 mmol/L    Potassium 3.4 (L) 3.7 - 5.3 mmol/L    Chloride 94 (L) 98 - 107 mmol/L    CO2 25 20 - 31 mmol/L    Anion Gap 13 9 - 17 mmol/L    GFR Non-African American >60 >60 mL/min    GFR African American >60 >60 mL/min    GFR Comment         Hepatic Function Panel   Result Value Ref Range    Albumin 4.5 3.5 - 5.2 g/dL    Alkaline Phosphatase 90 40 - 129 U/L    ALT 32 5 - 41 U/L    AST 33 <40 U/L    Total Bilirubin 1.02 0.3 - 1.2 mg/dL    Bilirubin, Direct 0.26 <0.31 mg/dL    Bilirubin, Indirect 0.76 0.00 - 1.00 mg/dL    Total Protein 8.2 6.4 - 8.3 g/dL    Albumin/Globulin Ratio 1.2 1.0 - 2.5   Lipase   Result Value Ref Range    Lipase 789 (H) 13 - 60 U/L   Urinalysis with Reflex to Culture    Specimen: Urine   Result Value Ref Range    Color, UA Yellow Yellow    Turbidity UA Clear Clear    Glucose, Ur NEGATIVE NEGATIVE    Bilirubin Urine NEGATIVE NEGATIVE    Ketones, Urine TRACE (A) NEGATIVE    Specific Gravity, UA 1.010 1.005 - 1.030    Urine Hgb NEGATIVE NEGATIVE    pH, UA 5.5 5.0 - 8.0    Protein, UA NEGATIVE NEGATIVE    Urobilinogen, Urine Normal Normal    Nitrite, Urine NEGATIVE NEGATIVE    Leukocyte Esterase, Urine NEGATIVE NEGATIVE    Urinalysis Comments       Microscopic exam not performed based on chemical results unless requested in original order. Urinalysis Comments          Urinalysis Comments       Utilizing a urinalysis as the only screening method to exclude a potential uropathogen can be unreliable in many patient populations. Rapid screening tests are less sensitive than culture and if UTI is a clinical possibility, culture should be considered despite a negative urinalysis. Magnesium   Result Value Ref Range    Magnesium 1.6 1.6 - 2.6 mg/dL       EMERGENCY DEPARTMENT COURSE     ED Course as of 05/17/22 1621   Tue May 17, 2022   1412 CBC demonstrates mild bump and a WBC 11.9. BMP demonstrates elevation glucose at 154 with hypokalemia 3.4 and mild hyponatremia and hypochloremia. Lipase is elevated at 7089 consistent with acute pancreatitis and is notably elevated from his previous occurrence of pancreatitis. LFTs are normal.  Mag is normal. [MG]   8175 CT scan reveals acute pancreatitis with concern for necrosis.  [MG]   12 Spoke with Dr. Delcid and they will accept the admission [MG]      ED Course User Index  [MG] Ade Salas PA-C        Vitals:    Vitals:    05/17/22 1318 05/17/22 1602 05/17/22 1614   BP: (!) 170/114 (!) 159/89 (!) 157/104   Pulse: 98  84   Resp: 18  18   Temp:   98.6 °F (37 °C)   TempSrc:   Oral   SpO2: 98% 95% 97%   Weight: 88.5 kg (195 lb)     Height: 5' 9\" (1.753 m)       -------------------------  BP: (!) 157/104, Temp: 98.6 °F (37 °C), Pulse: 84, Resp: 18      RE-EVALUATION:  See ED Course notes above. CONSULTS:  Hosp    PROCEDURES:  None    FINAL IMPRESSION      1. Alcohol-induced acute pancreatitis with uninfected necrosis          DISPOSITION / PLAN     CONDITION ON DISPOSITION:   Good / Stable for admission. PATIENT REFERRED TO:  No follow-up provider specified.     DISCHARGE MEDICATIONS:  Current Discharge Medication List          Ksenia Adams [place]   Emergency Medicine Physician Assistant    (Please note that portions of this note were completed with a voice recognition program.  Efforts were made to edit the dictations but occasionally words aremis-transcribed.)      Ksenia Adams PA-C  05/17/22 [id number]

## 2022-05-18 ENCOUNTER — APPOINTMENT (OUTPATIENT)
Dept: CT IMAGING | Age: 42
DRG: 439 | End: 2022-05-18
Payer: COMMERCIAL

## 2022-05-18 LAB
ABSOLUTE EOS #: 0 K/UL (ref 0–0.4)
ABSOLUTE LYMPH #: 1.2 K/UL (ref 1–4.8)
ABSOLUTE MONO #: 0.4 K/UL (ref 0.1–1.2)
ALBUMIN SERPL-MCNC: 3.3 G/DL (ref 3.5–5.2)
ALBUMIN/GLOBULIN RATIO: 1.1 (ref 1–2.5)
ALP BLD-CCNC: 66 U/L (ref 40–129)
ALT SERPL-CCNC: 22 U/L (ref 5–41)
ANION GAP SERPL CALCULATED.3IONS-SCNC: 11 MMOL/L (ref 9–17)
AST SERPL-CCNC: 26 U/L
BASOPHILS # BLD: 0 % (ref 0–2)
BASOPHILS ABSOLUTE: 0 K/UL (ref 0–0.2)
BILIRUB SERPL-MCNC: 1.08 MG/DL (ref 0.3–1.2)
BUN BLDV-MCNC: 7 MG/DL (ref 6–20)
CALCIUM SERPL-MCNC: 7.9 MG/DL (ref 8.6–10.4)
CHLORIDE BLD-SCNC: 102 MMOL/L (ref 98–107)
CO2: 20 MMOL/L (ref 20–31)
CREAT SERPL-MCNC: 0.71 MG/DL (ref 0.7–1.2)
EOSINOPHILS RELATIVE PERCENT: 0 % (ref 1–4)
GFR AFRICAN AMERICAN: >60 ML/MIN
GFR NON-AFRICAN AMERICAN: >60 ML/MIN
GFR SERPL CREATININE-BSD FRML MDRD: ABNORMAL ML/MIN/{1.73_M2}
GLUCOSE BLD-MCNC: 138 MG/DL (ref 70–99)
HCT VFR BLD CALC: 45.3 % (ref 41–53)
HEMOGLOBIN: 15.4 G/DL (ref 13.5–17.5)
LIPASE: 1102 U/L (ref 13–60)
LYMPHOCYTES # BLD: 15 % (ref 24–44)
MCH RBC QN AUTO: 31.1 PG (ref 26–34)
MCHC RBC AUTO-ENTMCNC: 34.1 G/DL (ref 31–37)
MCV RBC AUTO: 91.3 FL (ref 80–100)
MONOCYTES # BLD: 5 % (ref 2–11)
PDW BLD-RTO: 13.2 % (ref 12.5–15.4)
PLATELET # BLD: 144 K/UL (ref 140–450)
PMV BLD AUTO: 9.2 FL (ref 6–12)
POTASSIUM SERPL-SCNC: 3.7 MMOL/L (ref 3.7–5.3)
RBC # BLD: 4.96 M/UL (ref 4.5–5.9)
SEG NEUTROPHILS: 80 % (ref 36–66)
SEGMENTED NEUTROPHILS ABSOLUTE COUNT: 6.4 K/UL (ref 1.8–7.7)
SODIUM BLD-SCNC: 133 MMOL/L (ref 135–144)
TOTAL PROTEIN: 6.3 G/DL (ref 6.4–8.3)
WBC # BLD: 8.1 K/UL (ref 3.5–11)

## 2022-05-18 PROCEDURE — 6360000002 HC RX W HCPCS: Performed by: NURSE PRACTITIONER

## 2022-05-18 PROCEDURE — 6370000000 HC RX 637 (ALT 250 FOR IP): Performed by: STUDENT IN AN ORGANIZED HEALTH CARE EDUCATION/TRAINING PROGRAM

## 2022-05-18 PROCEDURE — 6360000002 HC RX W HCPCS: Performed by: STUDENT IN AN ORGANIZED HEALTH CARE EDUCATION/TRAINING PROGRAM

## 2022-05-18 PROCEDURE — 36415 COLL VENOUS BLD VENIPUNCTURE: CPT

## 2022-05-18 PROCEDURE — 99232 SBSQ HOSP IP/OBS MODERATE 35: CPT | Performed by: STUDENT IN AN ORGANIZED HEALTH CARE EDUCATION/TRAINING PROGRAM

## 2022-05-18 PROCEDURE — A4216 STERILE WATER/SALINE, 10 ML: HCPCS | Performed by: NURSE PRACTITIONER

## 2022-05-18 PROCEDURE — 2580000003 HC RX 258: Performed by: NURSE PRACTITIONER

## 2022-05-18 PROCEDURE — 1210000000 HC MED SURG R&B

## 2022-05-18 PROCEDURE — 2500000003 HC RX 250 WO HCPCS: Performed by: STUDENT IN AN ORGANIZED HEALTH CARE EDUCATION/TRAINING PROGRAM

## 2022-05-18 PROCEDURE — 2700000000 HC OXYGEN THERAPY PER DAY

## 2022-05-18 PROCEDURE — 74176 CT ABD & PELVIS W/O CONTRAST: CPT

## 2022-05-18 PROCEDURE — 80053 COMPREHEN METABOLIC PANEL: CPT

## 2022-05-18 PROCEDURE — 85025 COMPLETE CBC W/AUTO DIFF WBC: CPT

## 2022-05-18 PROCEDURE — 2580000003 HC RX 258: Performed by: STUDENT IN AN ORGANIZED HEALTH CARE EDUCATION/TRAINING PROGRAM

## 2022-05-18 PROCEDURE — 83690 ASSAY OF LIPASE: CPT

## 2022-05-18 PROCEDURE — 6370000000 HC RX 637 (ALT 250 FOR IP): Performed by: NURSE PRACTITIONER

## 2022-05-18 PROCEDURE — 2500000003 HC RX 250 WO HCPCS: Performed by: NURSE PRACTITIONER

## 2022-05-18 RX ORDER — LOSARTAN POTASSIUM 50 MG/1
50 TABLET ORAL DAILY
Status: DISCONTINUED | OUTPATIENT
Start: 2022-05-18 | End: 2022-05-21 | Stop reason: HOSPADM

## 2022-05-18 RX ORDER — SIMETHICONE 80 MG
80 TABLET,CHEWABLE ORAL EVERY 6 HOURS PRN
Status: DISCONTINUED | OUTPATIENT
Start: 2022-05-18 | End: 2022-05-21 | Stop reason: HOSPADM

## 2022-05-18 RX ORDER — FENTANYL CITRATE 50 UG/ML
25 INJECTION, SOLUTION INTRAMUSCULAR; INTRAVENOUS
Status: DISCONTINUED | OUTPATIENT
Start: 2022-05-18 | End: 2022-05-18

## 2022-05-18 RX ADMIN — SIMETHICONE 80 MG: 80 TABLET, CHEWABLE ORAL at 06:25

## 2022-05-18 RX ADMIN — SIMETHICONE 80 MG: 80 TABLET, CHEWABLE ORAL at 18:30

## 2022-05-18 RX ADMIN — CLONAZEPAM 1 MG: 0.5 TABLET ORAL at 06:11

## 2022-05-18 RX ADMIN — LOSARTAN POTASSIUM 50 MG: 50 TABLET, FILM COATED ORAL at 16:04

## 2022-05-18 RX ADMIN — ONDANSETRON 4 MG: 2 INJECTION INTRAMUSCULAR; INTRAVENOUS at 01:42

## 2022-05-18 RX ADMIN — MEROPENEM 1000 MG: 1 INJECTION, POWDER, FOR SOLUTION INTRAVENOUS at 23:48

## 2022-05-18 RX ADMIN — SODIUM CHLORIDE, POTASSIUM CHLORIDE, SODIUM LACTATE AND CALCIUM CHLORIDE: 600; 310; 30; 20 INJECTION, SOLUTION INTRAVENOUS at 08:20

## 2022-05-18 RX ADMIN — MEROPENEM 1000 MG: 1 INJECTION, POWDER, FOR SOLUTION INTRAVENOUS at 08:34

## 2022-05-18 RX ADMIN — HYDROMORPHONE HYDROCHLORIDE 1 MG: 1 INJECTION, SOLUTION INTRAMUSCULAR; INTRAVENOUS; SUBCUTANEOUS at 14:26

## 2022-05-18 RX ADMIN — SODIUM CHLORIDE, POTASSIUM CHLORIDE, SODIUM LACTATE AND CALCIUM CHLORIDE: 600; 310; 30; 20 INJECTION, SOLUTION INTRAVENOUS at 19:37

## 2022-05-18 RX ADMIN — SODIUM CHLORIDE, PRESERVATIVE FREE 10 ML: 5 INJECTION INTRAVENOUS at 07:52

## 2022-05-18 RX ADMIN — HYDROMORPHONE HYDROCHLORIDE 1 MG: 1 INJECTION, SOLUTION INTRAMUSCULAR; INTRAVENOUS; SUBCUTANEOUS at 01:38

## 2022-05-18 RX ADMIN — MEROPENEM 1000 MG: 1 INJECTION, POWDER, FOR SOLUTION INTRAVENOUS at 16:11

## 2022-05-18 RX ADMIN — SODIUM CHLORIDE, POTASSIUM CHLORIDE, SODIUM LACTATE AND CALCIUM CHLORIDE: 600; 310; 30; 20 INJECTION, SOLUTION INTRAVENOUS at 16:12

## 2022-05-18 RX ADMIN — HYDROMORPHONE HYDROCHLORIDE 1 MG: 1 INJECTION, SOLUTION INTRAMUSCULAR; INTRAVENOUS; SUBCUTANEOUS at 04:59

## 2022-05-18 RX ADMIN — ENOXAPARIN SODIUM 40 MG: 100 INJECTION SUBCUTANEOUS at 08:20

## 2022-05-18 RX ADMIN — FENTANYL CITRATE 25 MCG: 50 INJECTION, SOLUTION INTRAMUSCULAR; INTRAVENOUS at 19:07

## 2022-05-18 RX ADMIN — ONDANSETRON 4 MG: 2 INJECTION INTRAMUSCULAR; INTRAVENOUS at 07:52

## 2022-05-18 RX ADMIN — SODIUM CHLORIDE, POTASSIUM CHLORIDE, SODIUM LACTATE AND CALCIUM CHLORIDE: 600; 310; 30; 20 INJECTION, SOLUTION INTRAVENOUS at 12:58

## 2022-05-18 RX ADMIN — ONDANSETRON 4 MG: 2 INJECTION INTRAMUSCULAR; INTRAVENOUS at 14:25

## 2022-05-18 RX ADMIN — SODIUM CHLORIDE, PRESERVATIVE FREE 20 MG: 5 INJECTION INTRAVENOUS at 21:15

## 2022-05-18 RX ADMIN — FENTANYL CITRATE 25 MCG: 50 INJECTION, SOLUTION INTRAMUSCULAR; INTRAVENOUS at 16:57

## 2022-05-18 RX ADMIN — ONDANSETRON 4 MG: 2 INJECTION INTRAMUSCULAR; INTRAVENOUS at 21:19

## 2022-05-18 RX ADMIN — SODIUM CHLORIDE, PRESERVATIVE FREE 20 MG: 5 INJECTION INTRAVENOUS at 07:54

## 2022-05-18 RX ADMIN — SODIUM CHLORIDE, POTASSIUM CHLORIDE, SODIUM LACTATE AND CALCIUM CHLORIDE: 600; 310; 30; 20 INJECTION, SOLUTION INTRAVENOUS at 03:33

## 2022-05-18 RX ADMIN — HYDROMORPHONE HYDROCHLORIDE 1 MG: 1 INJECTION, SOLUTION INTRAMUSCULAR; INTRAVENOUS; SUBCUTANEOUS at 21:24

## 2022-05-18 RX ADMIN — SODIUM CHLORIDE, POTASSIUM CHLORIDE, SODIUM LACTATE AND CALCIUM CHLORIDE: 600; 310; 30; 20 INJECTION, SOLUTION INTRAVENOUS at 23:33

## 2022-05-18 RX ADMIN — HYDROMORPHONE HYDROCHLORIDE 1 MG: 1 INJECTION, SOLUTION INTRAMUSCULAR; INTRAVENOUS; SUBCUTANEOUS at 11:11

## 2022-05-18 RX ADMIN — HYDROMORPHONE HYDROCHLORIDE 1 MG: 1 INJECTION, SOLUTION INTRAMUSCULAR; INTRAVENOUS; SUBCUTANEOUS at 07:52

## 2022-05-18 RX ADMIN — FOLIC ACID: 5 INJECTION, SOLUTION INTRAMUSCULAR; INTRAVENOUS; SUBCUTANEOUS at 17:12

## 2022-05-18 ASSESSMENT — PAIN DESCRIPTION - DESCRIPTORS
DESCRIPTORS: SHARP

## 2022-05-18 ASSESSMENT — PAIN DESCRIPTION - ORIENTATION
ORIENTATION: MID;LOWER

## 2022-05-18 ASSESSMENT — PAIN SCALES - GENERAL
PAINLEVEL_OUTOF10: 7
PAINLEVEL_OUTOF10: 7
PAINLEVEL_OUTOF10: 0
PAINLEVEL_OUTOF10: 7
PAINLEVEL_OUTOF10: 8
PAINLEVEL_OUTOF10: 7
PAINLEVEL_OUTOF10: 7
PAINLEVEL_OUTOF10: 0
PAINLEVEL_OUTOF10: 8
PAINLEVEL_OUTOF10: 0
PAINLEVEL_OUTOF10: 7
PAINLEVEL_OUTOF10: 0
PAINLEVEL_OUTOF10: 0
PAINLEVEL_OUTOF10: 8

## 2022-05-18 ASSESSMENT — PAIN DESCRIPTION - ONSET
ONSET: ON-GOING

## 2022-05-18 ASSESSMENT — PAIN DESCRIPTION - PAIN TYPE
TYPE: ACUTE PAIN

## 2022-05-18 ASSESSMENT — PAIN DESCRIPTION - FREQUENCY
FREQUENCY: CONTINUOUS

## 2022-05-18 ASSESSMENT — PAIN - FUNCTIONAL ASSESSMENT
PAIN_FUNCTIONAL_ASSESSMENT: ACTIVITIES ARE NOT PREVENTED

## 2022-05-18 ASSESSMENT — PAIN DESCRIPTION - LOCATION
LOCATION: ABDOMEN

## 2022-05-18 NOTE — PLAN OF CARE
Problem: Discharge Planning  Goal: Discharge to home or other facility with appropriate resources  5/18/2022 0342 by Hayden Rubio RN  Outcome: Progressing     Problem: Pain  Goal: Verbalizes/displays adequate comfort level or baseline comfort level  5/18/2022 0342 by Hayden Rubio RN  Outcome: Progressing   PRN pain medication ordered (See Mar).      Problem: ABCDS Injury Assessment  Goal: Absence of physical injury  5/18/2022 0342 by Hayden Rubio RN  Outcome: Progressing

## 2022-05-18 NOTE — PROGRESS NOTES
was able to quit drinking for several months but recently relapsed. In the ED, the patient was afebrile and nontoxic appearing. Lipase was found to be elevated at 789. CT scan of the abdomen and pelvis was done and was significant for acute pancreatitis with slightly decreased enhancement of the pancreatic tail suspicious for developing necrosis. The patient is admitted to internal medicine for further management. Review of Systems:     Constitutional:  negative for chills, fevers, sweats  Respiratory:  negative for cough, dyspnea on exertion, shortness of breath, wheezing  Cardiovascular:  negative for chest pain, chest pressure/discomfort, lower extremity edema, palpitations  Gastrointestinal: Positive for abdominal pain. Neurological:  negative for dizziness, headache    Medications: Allergies: Allergies   Allergen Reactions    Other      Animals- cats mostly       Current Meds:   Scheduled Meds:    meropenem  1,000 mg IntraVENous Q8H    sodium chloride  80 mL IntraVENous Once    sodium chloride flush  5-40 mL IntraVENous 2 times per day    enoxaparin  40 mg SubCUTAneous Daily    folic acid, thiamine, multi-vitamin with vitamin K infusion   IntraVENous Daily    famotidine (PEPCID) injection  20 mg IntraVENous BID     Continuous Infusions:    lactated ringers 250 mL/hr at 05/18/22 0333    sodium chloride       PRN Meds: simethicone, sodium chloride flush, sodium chloride flush, sodium chloride, ondansetron **OR** ondansetron, polyethylene glycol, acetaminophen **OR** acetaminophen, potassium chloride **OR** potassium alternative oral replacement **OR** potassium chloride, HYDROmorphone **OR** HYDROmorphone, clonazePAM    Data:     Past Medical History:   has a past medical history of Anxiety, Asthma, ETOH abuse, Hypertension, and Pancreatitis. Social History:   reports that he quit smoking about 12 months ago. His smoking use included cigarettes.  He has never used smokeless tobacco. He reports current alcohol use. He reports that he does not use drugs. Family History:   Family History   Problem Relation Age of Onset    Other Mother         churg wayne syndrome    Emphysema Father     Cancer Maternal Uncle         abdominal area       Vitals:  BP (!) 149/96   Pulse 85   Temp 99.5 °F (37.5 °C) (Oral)   Resp 19   Ht 5' 9\" (1.753 m)   Wt 197 lb 8.5 oz (89.6 kg)   SpO2 97%   BMI 29.17 kg/m²   Temp (24hrs), Av.7 °F (37.1 °C), Min:98.2 °F (36.8 °C), Max:99.5 °F (37.5 °C)    No results for input(s): POCGLU in the last 72 hours. I/O (24Hr): Intake/Output Summary (Last 24 hours) at 2022 0819  Last data filed at 2022 0502  Gross per 24 hour   Intake 3641.42 ml   Output 650 ml   Net 2991.42 ml       Labs:  Hematology:  Recent Labs     22  1327 22  0515   WBC 11.9* 8.1   RBC 5.10 4.96   HGB 16.0 15.4   HCT 46.5 45.3   MCV 91.1 91.3   MCH 31.3 31.1   MCHC 34.4 34.1   RDW 13.0 13.2    144   MPV 9.0 9.2     Chemistry:  Recent Labs     22  1327 22  0515   * 133*   K 3.4* 3.7   CL 94* 102   CO2 25 20   GLUCOSE 154* 138*   BUN 15 7   CREATININE 1.09 0.71   MG 1.6  --    ANIONGAP 13 11   LABGLOM >60 >60   GFRAA >60 >60   CALCIUM 9.1 7.9*     Recent Labs     22  1327 22  0515   PROT 8.2 6.3*   LABALBU 4.5 3.3*   AST 33 26   ALT 32 22   ALKPHOS 90 66   BILITOT 1.02 1.08   BILIDIR 0.26  --    LIPASE 789* 1,102*     ABG:No results found for: POCPH, PHART, PH, POCPCO2, MOP3UDS, PCO2, POCPO2, PO2ART, PO2, POCHCO3, YOX3WJZ, HCO3, NBEA, PBEA, BEART, BE, THGBART, THB, IWL8CTS, JOMM4NSA, D8SYKKEF, O2SAT, FIO2  No results found for: SPECIAL  No results found for: CULTURE    Radiology:  CT ABDOMEN PELVIS W IV CONTRAST Additional Contrast? None    Result Date: 2022  Acute pancreatitis. Slightly decreased enhancement of the pancreatic tail, suspicious for developing necrosis.        Physical Examination:     General appearance:  alert, cooperative and no distress  Mental Status:  oriented to person, place and time and normal affect  Lungs:  clear to auscultation bilaterally, normal effort  Heart:  regular rate and rhythm, no murmur  Abdomen:  Epigastric tenderness appreciated. Abdomen is distended with hypoactive bowel sounds.    Extremities:  no edema, redness, tenderness in the calves  Skin:  no gross lesions, rashes, induration    Assessment:     Hospital Problems           Last Modified POA    * (Principal) Acute recurrent pancreatitis 5/17/2022 Yes    Hypokalemia 5/17/2022 Yes    ETOH abuse 5/17/2022 Yes          Plan:     Acute pancreatitis   -Secondary to alcohol abuse   -Maintenance fluids at 200 mL/hr   -Dilaudid pain panel   -Will repeat CT abdomen and pelvis this AM to rule-out necrosis and/or pseudocyst formation   -Start meropenem 1 gram q8h   -Diet NPO   -Daily CBC   -Daily BMP      Alcohol use disorder   -Patient states that he drinks 6-7 beers daily   -Counseled on cessation   -Daily thiamine infusion   -Monitor for signs and symptoms of withdrawal      Hyponatremia   -Secondary to SIADH from pain versus beer potomania   -Daily BMP as above      Hypokalemia   -Replacement per protocol     Petra Moore MD  5/18/2022  8:19 AM

## 2022-05-19 LAB
ABSOLUTE EOS #: 0.1 K/UL (ref 0–0.4)
ABSOLUTE LYMPH #: 0.6 K/UL (ref 1–4.8)
ABSOLUTE MONO #: 0.4 K/UL (ref 0.1–1.2)
ALBUMIN SERPL-MCNC: 2.9 G/DL (ref 3.5–5.2)
ALBUMIN/GLOBULIN RATIO: 1 (ref 1–2.5)
ALP BLD-CCNC: 51 U/L (ref 40–129)
ALT SERPL-CCNC: 15 U/L (ref 5–41)
ANION GAP SERPL CALCULATED.3IONS-SCNC: 7 MMOL/L (ref 9–17)
AST SERPL-CCNC: 26 U/L
BASOPHILS # BLD: 0 % (ref 0–2)
BASOPHILS ABSOLUTE: 0 K/UL (ref 0–0.2)
BILIRUB SERPL-MCNC: 4.26 MG/DL (ref 0.3–1.2)
BUN BLDV-MCNC: 5 MG/DL (ref 6–20)
CALCIUM SERPL-MCNC: 8 MG/DL (ref 8.6–10.4)
CHLORIDE BLD-SCNC: 103 MMOL/L (ref 98–107)
CO2: 26 MMOL/L (ref 20–31)
CREAT SERPL-MCNC: 0.6 MG/DL (ref 0.7–1.2)
EOSINOPHILS RELATIVE PERCENT: 2 % (ref 1–4)
GFR AFRICAN AMERICAN: >60 ML/MIN
GFR NON-AFRICAN AMERICAN: >60 ML/MIN
GFR SERPL CREATININE-BSD FRML MDRD: ABNORMAL ML/MIN/{1.73_M2}
GLUCOSE BLD-MCNC: 110 MG/DL (ref 70–99)
HCT VFR BLD CALC: 37.1 % (ref 41–53)
HEMOGLOBIN: 12.7 G/DL (ref 13.5–17.5)
LYMPHOCYTES # BLD: 12 % (ref 24–44)
MCH RBC QN AUTO: 31.3 PG (ref 26–34)
MCHC RBC AUTO-ENTMCNC: 34.2 G/DL (ref 31–37)
MCV RBC AUTO: 91.7 FL (ref 80–100)
MONOCYTES # BLD: 8 % (ref 2–11)
PDW BLD-RTO: 13 % (ref 12.5–15.4)
PLATELET # BLD: 91 K/UL (ref 140–450)
PMV BLD AUTO: 9.7 FL (ref 6–12)
POTASSIUM SERPL-SCNC: 3.7 MMOL/L (ref 3.7–5.3)
RBC # BLD: 4.04 M/UL (ref 4.5–5.9)
SEG NEUTROPHILS: 78 % (ref 36–66)
SEGMENTED NEUTROPHILS ABSOLUTE COUNT: 4.2 K/UL (ref 1.8–7.7)
SODIUM BLD-SCNC: 136 MMOL/L (ref 135–144)
TOTAL PROTEIN: 5.7 G/DL (ref 6.4–8.3)
WBC # BLD: 5.4 K/UL (ref 3.5–11)

## 2022-05-19 PROCEDURE — 6360000002 HC RX W HCPCS: Performed by: NURSE PRACTITIONER

## 2022-05-19 PROCEDURE — 36415 COLL VENOUS BLD VENIPUNCTURE: CPT

## 2022-05-19 PROCEDURE — 2580000003 HC RX 258: Performed by: STUDENT IN AN ORGANIZED HEALTH CARE EDUCATION/TRAINING PROGRAM

## 2022-05-19 PROCEDURE — 6370000000 HC RX 637 (ALT 250 FOR IP): Performed by: STUDENT IN AN ORGANIZED HEALTH CARE EDUCATION/TRAINING PROGRAM

## 2022-05-19 PROCEDURE — 1210000000 HC MED SURG R&B

## 2022-05-19 PROCEDURE — 6360000002 HC RX W HCPCS: Performed by: STUDENT IN AN ORGANIZED HEALTH CARE EDUCATION/TRAINING PROGRAM

## 2022-05-19 PROCEDURE — 2500000003 HC RX 250 WO HCPCS: Performed by: STUDENT IN AN ORGANIZED HEALTH CARE EDUCATION/TRAINING PROGRAM

## 2022-05-19 PROCEDURE — 85025 COMPLETE CBC W/AUTO DIFF WBC: CPT

## 2022-05-19 PROCEDURE — 99232 SBSQ HOSP IP/OBS MODERATE 35: CPT | Performed by: STUDENT IN AN ORGANIZED HEALTH CARE EDUCATION/TRAINING PROGRAM

## 2022-05-19 PROCEDURE — 80053 COMPREHEN METABOLIC PANEL: CPT

## 2022-05-19 PROCEDURE — 6370000000 HC RX 637 (ALT 250 FOR IP): Performed by: NURSE PRACTITIONER

## 2022-05-19 PROCEDURE — 2580000003 HC RX 258: Performed by: NURSE PRACTITIONER

## 2022-05-19 PROCEDURE — 2500000003 HC RX 250 WO HCPCS: Performed by: NURSE PRACTITIONER

## 2022-05-19 RX ORDER — CLONAZEPAM 0.5 MG/1
1 TABLET ORAL 2 TIMES DAILY PRN
Status: DISCONTINUED | OUTPATIENT
Start: 2022-05-19 | End: 2022-05-21 | Stop reason: HOSPADM

## 2022-05-19 RX ORDER — CLONAZEPAM 0.5 MG/1
1 TABLET ORAL 2 TIMES DAILY
Status: DISCONTINUED | OUTPATIENT
Start: 2022-05-19 | End: 2022-05-19

## 2022-05-19 RX ADMIN — SODIUM CHLORIDE, POTASSIUM CHLORIDE, SODIUM LACTATE AND CALCIUM CHLORIDE: 600; 310; 30; 20 INJECTION, SOLUTION INTRAVENOUS at 08:46

## 2022-05-19 RX ADMIN — MEROPENEM 1000 MG: 1 INJECTION, POWDER, FOR SOLUTION INTRAVENOUS at 08:50

## 2022-05-19 RX ADMIN — SODIUM CHLORIDE, PRESERVATIVE FREE 20 MG: 5 INJECTION INTRAVENOUS at 20:51

## 2022-05-19 RX ADMIN — LOSARTAN POTASSIUM 50 MG: 50 TABLET, FILM COATED ORAL at 08:42

## 2022-05-19 RX ADMIN — SIMETHICONE 80 MG: 80 TABLET, CHEWABLE ORAL at 04:11

## 2022-05-19 RX ADMIN — SODIUM CHLORIDE, PRESERVATIVE FREE 10 ML: 5 INJECTION INTRAVENOUS at 12:24

## 2022-05-19 RX ADMIN — SODIUM CHLORIDE, PRESERVATIVE FREE 10 ML: 5 INJECTION INTRAVENOUS at 20:52

## 2022-05-19 RX ADMIN — ACETAMINOPHEN 650 MG: 325 TABLET ORAL at 16:06

## 2022-05-19 RX ADMIN — MEROPENEM 1000 MG: 1 INJECTION, POWDER, FOR SOLUTION INTRAVENOUS at 16:10

## 2022-05-19 RX ADMIN — SODIUM CHLORIDE, POTASSIUM CHLORIDE, SODIUM LACTATE AND CALCIUM CHLORIDE: 600; 310; 30; 20 INJECTION, SOLUTION INTRAVENOUS at 17:01

## 2022-05-19 RX ADMIN — SODIUM CHLORIDE 10 ML/HR: 9 INJECTION, SOLUTION INTRAVENOUS at 08:49

## 2022-05-19 RX ADMIN — SODIUM CHLORIDE 10 ML/HR: 9 INJECTION, SOLUTION INTRAVENOUS at 16:08

## 2022-05-19 RX ADMIN — SODIUM CHLORIDE, PRESERVATIVE FREE 20 MG: 5 INJECTION INTRAVENOUS at 08:41

## 2022-05-19 RX ADMIN — HYDROMORPHONE HYDROCHLORIDE 0.5 MG: 1 INJECTION, SOLUTION INTRAMUSCULAR; INTRAVENOUS; SUBCUTANEOUS at 04:10

## 2022-05-19 RX ADMIN — FOLIC ACID: 5 INJECTION, SOLUTION INTRAMUSCULAR; INTRAVENOUS; SUBCUTANEOUS at 17:04

## 2022-05-19 RX ADMIN — SODIUM CHLORIDE, POTASSIUM CHLORIDE, SODIUM LACTATE AND CALCIUM CHLORIDE: 600; 310; 30; 20 INJECTION, SOLUTION INTRAVENOUS at 04:05

## 2022-05-19 RX ADMIN — ENOXAPARIN SODIUM 40 MG: 100 INJECTION SUBCUTANEOUS at 08:41

## 2022-05-19 RX ADMIN — SODIUM CHLORIDE, POTASSIUM CHLORIDE, SODIUM LACTATE AND CALCIUM CHLORIDE: 600; 310; 30; 20 INJECTION, SOLUTION INTRAVENOUS at 13:11

## 2022-05-19 RX ADMIN — HYDROMORPHONE HYDROCHLORIDE 1 MG: 1 INJECTION, SOLUTION INTRAMUSCULAR; INTRAVENOUS; SUBCUTANEOUS at 00:32

## 2022-05-19 ASSESSMENT — PAIN DESCRIPTION - LOCATION
LOCATION: ABDOMEN

## 2022-05-19 ASSESSMENT — PAIN SCALES - GENERAL
PAINLEVEL_OUTOF10: 0
PAINLEVEL_OUTOF10: 7
PAINLEVEL_OUTOF10: 0
PAINLEVEL_OUTOF10: 5
PAINLEVEL_OUTOF10: 3
PAINLEVEL_OUTOF10: 0

## 2022-05-19 ASSESSMENT — PAIN DESCRIPTION - FREQUENCY
FREQUENCY: CONTINUOUS

## 2022-05-19 ASSESSMENT — PAIN DESCRIPTION - ORIENTATION
ORIENTATION: MID;LOWER

## 2022-05-19 ASSESSMENT — PAIN DESCRIPTION - ONSET
ONSET: ON-GOING

## 2022-05-19 ASSESSMENT — PAIN DESCRIPTION - DESCRIPTORS
DESCRIPTORS: DULL;ACHING
DESCRIPTORS: SHARP;ACHING
DESCRIPTORS: ACHING;SHARP

## 2022-05-19 ASSESSMENT — PAIN - FUNCTIONAL ASSESSMENT
PAIN_FUNCTIONAL_ASSESSMENT: ACTIVITIES ARE NOT PREVENTED

## 2022-05-19 ASSESSMENT — PAIN DESCRIPTION - PAIN TYPE
TYPE: ACUTE PAIN

## 2022-05-19 NOTE — CARE COORDINATION
SW consulted for alcohol use. Patient reports history of alcohol use but denies excessive use at this time. Reports drinking socially after work or on the weekends. Patient had reported drinking 6-7 beers daily to physician. Patient reports to SW that he has not drank in 6-7 days. Patient reported stress due to work and losing spouse 7 years ago. Reports when he does drink it does not effect his daily activities. Patient reports attending EMDR Therapy in the past with Patience Donald, denies need for resources at this time. Has support from friends and coworkers. Will notify SW if assistance needed.

## 2022-05-19 NOTE — PLAN OF CARE
Problem: Pain  Goal: Verbalizes/displays adequate comfort level or baseline comfort level  5/19/2022 1549 by Jacqueline Schmidt RN  Outcome: Progressing  Flowsheets (Taken 5/19/2022 0800)  Verbalizes/displays adequate comfort level or baseline comfort level:   Encourage patient to monitor pain and request assistance   Assess pain using appropriate pain scale   Administer analgesics based on type and severity of pain and evaluate response   Implement non-pharmacological measures as appropriate and evaluate response  5/19/2022 0235 by Jen Martínez RN  Outcome: Progressing   Pain scale preformed per protocol and pt treated for pain as documented. Education given. Problem: ABCDS Injury Assessment  Goal: Absence of physical injury  5/19/2022 1549 by Jacqueline Schmidt RN  Outcome: Progressing  Flowsheets (Taken 5/19/2022 0749)  Absence of Physical Injury: Implement safety measures based on patient assessment  5/19/2022 0235 by Jen Martínez RN  Outcome: Progressing   Patient remains free of injury at this time. High flow fluids infusing.

## 2022-05-19 NOTE — PROGRESS NOTES
[facility]  Office: [address], DO, Hitesh Johnsonf, DO, Dylan Rizo, DO, Bee Khalil Blood, DO, Pablo Fuentes MD, Nelly Kolb MD, Clarissa Mccracken MD, Liban Mireles MD, Les Simpson MD, Jacinda Gloria MD, Fallon Mane MD, Uriel Pry, DO, Debra Harness, DO, Riya Sotelo MD,  Jonathan Almeida, DO, María Zamora MD, Callie Webb MD, Chula Camp MD, Harinder Mathis DO, Jaz Domínguez MD, Reinaldo Razo MD, Bee Gonzalez MD, Leatha Espinosa CNP, [facility], CNP, [place] Fernandez, CNP, Gerilyn Cockayne, CNP, Stephanie Palencia, CNP, Camillo Schaumann, CNP, Ugo De La Cruz PA-C, Marcelo Fuentes, REJI, Delgado Mejía, CNP, George Doshi, CNP, Isa Ring, CNP, Macario Gutierrez, CNS, Christophe Bowens, [facility], Emely Lantigua, CNP, Mahesh Landis, CNP, Reyna Fergusno, [facility]         [facility]    Progress Note    5/19/2022    7:54 AM    Name:   Levon Ritter  MRN:     [id number]     Acct:      [de-identified]   Room:   [address] [place] [address] Day:  2  Admit Date:  5/17/2022  1:14 PM    PCP:   Eula Simmonds, APRN - CNP  Code Status:  Full Code    Subjective:     C/C:   Chief Complaint   Patient presents with    Abdominal Pain     Interval History Status: significantly improved. Patient was seen and examined at bedside this AM. He reports feeling much better and states that his abdominal pain has significantly improved. Denies fever/chills, nausea/vomiting, shortness of breath or chest. Plan to continue fluids and advance diet to clear liquids later today. Anticipate discharge in 1-2 days. Brief History:     Levon Ritter is a 42 y.o. male with a past medical history of alcohol use disorder and alcoholic pancreatitis who presented to the emergency department on 5/17/2022 complaining of nausea/vomiting and intractable abdominal pain. The patient states that his symptoms began about a week ago and have progressively worsened.  He states that he has had alcoholic pancreatitis in the past and was able to quit drinking for several months but recently relapsed. In the ED, the patient was afebrile and nontoxic appearing. Lipase was found to be elevated at 789. CT scan of the abdomen and pelvis was done and was significant for acute pancreatitis with slightly decreased enhancement of the pancreatic tail suspicious for developing necrosis. The patient is admitted to internal medicine for further management. Review of Systems:     Constitutional:  negative for chills, fevers, sweats  Respiratory:  negative for cough, dyspnea on exertion, shortness of breath, wheezing  Cardiovascular:  negative for chest pain, chest pressure/discomfort, lower extremity edema, palpitations  Gastrointestinal: Positive for abdominal pain. Neurological:  negative for dizziness, headache    Medications: Allergies: Allergies   Allergen Reactions    Other      Animals- cats mostly       Current Meds:   Scheduled Meds:    meropenem  1,000 mg IntraVENous Q8H    losartan  50 mg Oral Daily    sodium chloride  80 mL IntraVENous Once    sodium chloride flush  5-40 mL IntraVENous 2 times per day    enoxaparin  40 mg SubCUTAneous Daily    folic acid, thiamine, multi-vitamin with vitamin K infusion   IntraVENous Daily    famotidine (PEPCID) injection  20 mg IntraVENous BID     Continuous Infusions:    lactated ringers 250 mL/hr at 05/19/22 0405    sodium chloride       PRN Meds: simethicone, HYDROmorphone **OR** HYDROmorphone, sodium chloride flush, sodium chloride flush, sodium chloride, ondansetron **OR** ondansetron, polyethylene glycol, acetaminophen **OR** acetaminophen, potassium chloride **OR** potassium alternative oral replacement **OR** potassium chloride    Data:     Past Medical History:   has a past medical history of Anxiety, Asthma, ETOH abuse, Hypertension, and Pancreatitis. Social History:   reports that he quit smoking about 12 months ago.  His smoking use included cigarettes. He has never used smokeless tobacco. He reports current alcohol use. He reports that he does not use drugs. Family History:   Family History   Problem Relation Age of Onset    Other Mother         churg wyane syndrome    Emphysema Father     Cancer Maternal Uncle         abdominal area       Vitals:  BP (!) 149/94   Pulse 95   Temp 99.3 °F (37.4 °C) (Axillary)   Resp 14   Ht 5' 9\" (1.753 m)   Wt 197 lb 8.5 oz (89.6 kg)   SpO2 98%   BMI 29.17 kg/m²   Temp (24hrs), Av.2 °F (37.3 °C), Min:98.7 °F (37.1 °C), Max:99.7 °F (37.6 °C)    No results for input(s): POCGLU in the last 72 hours. I/O (24Hr):     Intake/Output Summary (Last 24 hours) at 2022 0754  Last data filed at 2022 0601  Gross per 24 hour   Intake 6871.5 ml   Output 2725 ml   Net 4146.5 ml       Labs:  Hematology:  Recent Labs     22  1327 22  0515 22  0517   WBC 11.9* 8.1 5.4   RBC 5.10 4.96 4.04*   HGB 16.0 15.4 12.7*   HCT 46.5 45.3 37.1*   MCV 91.1 91.3 91.7   MCH 31.3 31.1 31.3   MCHC 34.4 34.1 34.2   RDW 13.0 13.2 13.0    144 91*   MPV 9.0 9.2 9.7     Chemistry:  Recent Labs     22  1327 22  0515 22  0517   * 133* 136   K 3.4* 3.7 3.7   CL 94* 102 103   CO2 25 20 26   GLUCOSE 154* 138* 110*   BUN 15 7 5*   CREATININE 1.09 0.71 0.60*   MG 1.6  --   --    ANIONGAP 13 11 7*   LABGLOM >60 >60 >60   GFRAA >60 >60 >60   CALCIUM 9.1 7.9* 8.0*     Recent Labs     22  1327 22  0515 22  0517   PROT 8.2 6.3* 5.7*   LABALBU 4.5 3.3* 2.9*   AST 33 26 26   ALT 32 22 15   ALKPHOS 90 66 51   BILITOT 1.02 1.08 4.26*   BILIDIR 0.26  --   --    LIPASE 789* 1,102*  --      ABG:No results found for: POCPH, PHART, PH, POCPCO2, GUM7BHZ, PCO2, POCPO2, PO2ART, PO2, POCHCO3, MGI2LAZ, HCO3, NBEA, PBEA, BEART, BE, THGBART, THB, IBX0XJR, OMHH6BRI, T0BZKOEX, O2SAT, FIO2  No results found for: SPECIAL  No results found for: CULTURE    Radiology:  CT ABDOMEN PELVIS W IV CONTRAST Additional Contrast? None    Result Date: 5/17/2022  Acute pancreatitis. Slightly decreased enhancement of the pancreatic tail, suspicious for developing necrosis. Physical Examination:     General appearance:  alert, cooperative and no distress  Mental Status:  oriented to person, place and time and normal affect  Lungs:  clear to auscultation bilaterally, normal effort  Heart:  regular rate and rhythm, no murmur  Abdomen:  Epigastric tenderness appreciated. Distension has resolved.    Extremities:  no edema, redness, tenderness in the calves  Skin:  no gross lesions, rashes, induration    Assessment:     Hospital Problems           Last Modified POA    * (Principal) Acute recurrent pancreatitis 5/17/2022 Yes    Hypokalemia 5/17/2022 Yes    ETOH abuse 5/17/2022 Yes          Plan:     Acute pancreatitis   -Secondary to alcohol abuse   -Maintenance fluids at 200 mL/hr   -Dilaudid pain panel   -Repeat CT abdomen and pelvis showing worsening pancreatitis   -Continue meropenem 1 gram q8h   -Diet NPO   -Daily CBC   -Daily BMP      Alcohol use disorder   -Patient states that he drinks 6-7 beers daily   -Counseled on cessation   -Daily thiamine infusion   -Monitor for signs and symptoms of withdrawal      Hyponatremia, resolved    -Secondary to SIADH from pain versus beer potomania   -Daily BMP as above      Hypokalemia   -Replacement per protocol     Avery Rivas MD  5/19/2022  7:54 AM

## 2022-05-19 NOTE — PLAN OF CARE
Problem: Discharge Planning  Goal: Discharge to home or other facility with appropriate resources  5/19/2022 0235 by Mi Hughes RN  Outcome: Progressing     Problem: Pain  Goal: Verbalizes/displays adequate comfort level or baseline comfort level  5/19/2022 0235 by Mi Hughes RN  Outcome: Progressing   PRN pain medications ordered (See Mar).      Problem: ABCDS Injury Assessment  Goal: Absence of physical injury  5/19/2022 0235 by Mi Hughes RN  Outcome: Progressing

## 2022-05-19 NOTE — PROGRESS NOTES
Patient requesting dilaudid instead of fentanyl due to the fentanyl not helping as much as the dilaudid. RAFAEL garcia served S. Rinku Perez CNP. Ne orders placed (See Mar).

## 2022-05-20 ENCOUNTER — APPOINTMENT (OUTPATIENT)
Dept: ULTRASOUND IMAGING | Age: 42
DRG: 439 | End: 2022-05-20
Payer: COMMERCIAL

## 2022-05-20 LAB
ABSOLUTE EOS #: 0.2 K/UL (ref 0–0.4)
ABSOLUTE LYMPH #: 0.5 K/UL (ref 1–4.8)
ABSOLUTE MONO #: 0.4 K/UL (ref 0.1–1.2)
ALBUMIN SERPL-MCNC: 2.9 G/DL (ref 3.5–5.2)
ALBUMIN/GLOBULIN RATIO: 1 (ref 1–2.5)
ALP BLD-CCNC: 116 U/L (ref 40–129)
ALT SERPL-CCNC: 37 U/L (ref 5–41)
ANION GAP SERPL CALCULATED.3IONS-SCNC: 10 MMOL/L (ref 9–17)
AST SERPL-CCNC: 64 U/L
BASOPHILS # BLD: 0 % (ref 0–2)
BASOPHILS ABSOLUTE: 0 K/UL (ref 0–0.2)
BILIRUB SERPL-MCNC: 6.84 MG/DL (ref 0.3–1.2)
BILIRUBIN DIRECT: 5.98 MG/DL
BUN BLDV-MCNC: 4 MG/DL (ref 6–20)
CALCIUM SERPL-MCNC: 8.4 MG/DL (ref 8.6–10.4)
CHLORIDE BLD-SCNC: 103 MMOL/L (ref 98–107)
CO2: 26 MMOL/L (ref 20–31)
CREAT SERPL-MCNC: 0.49 MG/DL (ref 0.7–1.2)
EOSINOPHILS RELATIVE PERCENT: 5 % (ref 1–4)
GFR AFRICAN AMERICAN: >60 ML/MIN
GFR NON-AFRICAN AMERICAN: >60 ML/MIN
GFR SERPL CREATININE-BSD FRML MDRD: ABNORMAL ML/MIN/{1.73_M2}
GLUCOSE BLD-MCNC: 112 MG/DL (ref 70–99)
HCT VFR BLD CALC: 35.1 % (ref 41–53)
HEMOGLOBIN: 11.9 G/DL (ref 13.5–17.5)
INR BLD: 1
LYMPHOCYTES # BLD: 11 % (ref 24–44)
MCH RBC QN AUTO: 31.5 PG (ref 26–34)
MCHC RBC AUTO-ENTMCNC: 33.8 G/DL (ref 31–37)
MCV RBC AUTO: 93.2 FL (ref 80–100)
MONOCYTES # BLD: 8 % (ref 2–11)
PDW BLD-RTO: 12.8 % (ref 12.5–15.4)
PLATELET # BLD: 86 K/UL (ref 140–450)
PMV BLD AUTO: 9.6 FL (ref 6–12)
POTASSIUM SERPL-SCNC: 4.1 MMOL/L (ref 3.7–5.3)
PROTHROMBIN TIME: 10.2 SEC (ref 9.4–12.6)
RBC # BLD: 3.76 M/UL (ref 4.5–5.9)
SEG NEUTROPHILS: 76 % (ref 36–66)
SEGMENTED NEUTROPHILS ABSOLUTE COUNT: 3.4 K/UL (ref 1.8–7.7)
SODIUM BLD-SCNC: 139 MMOL/L (ref 135–144)
TOTAL PROTEIN: 5.8 G/DL (ref 6.4–8.3)
WBC # BLD: 4.5 K/UL (ref 3.5–11)

## 2022-05-20 PROCEDURE — 1210000000 HC MED SURG R&B

## 2022-05-20 PROCEDURE — 2580000003 HC RX 258: Performed by: NURSE PRACTITIONER

## 2022-05-20 PROCEDURE — 6360000002 HC RX W HCPCS: Performed by: STUDENT IN AN ORGANIZED HEALTH CARE EDUCATION/TRAINING PROGRAM

## 2022-05-20 PROCEDURE — 6370000000 HC RX 637 (ALT 250 FOR IP): Performed by: NURSE PRACTITIONER

## 2022-05-20 PROCEDURE — 2500000003 HC RX 250 WO HCPCS: Performed by: HOSPITALIST

## 2022-05-20 PROCEDURE — 2500000003 HC RX 250 WO HCPCS: Performed by: NURSE PRACTITIONER

## 2022-05-20 PROCEDURE — 76705 ECHO EXAM OF ABDOMEN: CPT

## 2022-05-20 PROCEDURE — 80053 COMPREHEN METABOLIC PANEL: CPT

## 2022-05-20 PROCEDURE — 85610 PROTHROMBIN TIME: CPT

## 2022-05-20 PROCEDURE — 82248 BILIRUBIN DIRECT: CPT

## 2022-05-20 PROCEDURE — 6370000000 HC RX 637 (ALT 250 FOR IP): Performed by: HOSPITALIST

## 2022-05-20 PROCEDURE — 99232 SBSQ HOSP IP/OBS MODERATE 35: CPT | Performed by: HOSPITALIST

## 2022-05-20 PROCEDURE — 6370000000 HC RX 637 (ALT 250 FOR IP): Performed by: STUDENT IN AN ORGANIZED HEALTH CARE EDUCATION/TRAINING PROGRAM

## 2022-05-20 PROCEDURE — 2580000003 HC RX 258: Performed by: STUDENT IN AN ORGANIZED HEALTH CARE EDUCATION/TRAINING PROGRAM

## 2022-05-20 PROCEDURE — 85025 COMPLETE CBC W/AUTO DIFF WBC: CPT

## 2022-05-20 PROCEDURE — 36415 COLL VENOUS BLD VENIPUNCTURE: CPT

## 2022-05-20 RX ORDER — FAMOTIDINE 20 MG/1
20 TABLET, FILM COATED ORAL 2 TIMES DAILY
Status: DISCONTINUED | OUTPATIENT
Start: 2022-05-20 | End: 2022-05-21 | Stop reason: HOSPADM

## 2022-05-20 RX ORDER — LOPERAMIDE HYDROCHLORIDE 2 MG/1
2 CAPSULE ORAL 4 TIMES DAILY PRN
Status: DISCONTINUED | OUTPATIENT
Start: 2022-05-20 | End: 2022-05-21 | Stop reason: HOSPADM

## 2022-05-20 RX ORDER — LACTOBACILLUS RHAMNOSUS GG 10B CELL
1 CAPSULE ORAL
Status: DISCONTINUED | OUTPATIENT
Start: 2022-05-20 | End: 2022-05-21 | Stop reason: HOSPADM

## 2022-05-20 RX ORDER — METOPROLOL TARTRATE 5 MG/5ML
5 INJECTION INTRAVENOUS EVERY 4 HOURS PRN
Status: DISCONTINUED | OUTPATIENT
Start: 2022-05-20 | End: 2022-05-21 | Stop reason: HOSPADM

## 2022-05-20 RX ADMIN — MEROPENEM 1000 MG: 1 INJECTION, POWDER, FOR SOLUTION INTRAVENOUS at 07:54

## 2022-05-20 RX ADMIN — SODIUM CHLORIDE 10 ML/HR: 9 INJECTION, SOLUTION INTRAVENOUS at 07:53

## 2022-05-20 RX ADMIN — SODIUM CHLORIDE, POTASSIUM CHLORIDE, SODIUM LACTATE AND CALCIUM CHLORIDE: 600; 310; 30; 20 INJECTION, SOLUTION INTRAVENOUS at 14:59

## 2022-05-20 RX ADMIN — LOSARTAN POTASSIUM 50 MG: 50 TABLET, FILM COATED ORAL at 07:47

## 2022-05-20 RX ADMIN — SODIUM CHLORIDE, POTASSIUM CHLORIDE, SODIUM LACTATE AND CALCIUM CHLORIDE: 600; 310; 30; 20 INJECTION, SOLUTION INTRAVENOUS at 11:04

## 2022-05-20 RX ADMIN — SODIUM CHLORIDE, PRESERVATIVE FREE 10 ML: 5 INJECTION INTRAVENOUS at 09:27

## 2022-05-20 RX ADMIN — SODIUM CHLORIDE, PRESERVATIVE FREE 20 MG: 5 INJECTION INTRAVENOUS at 07:46

## 2022-05-20 RX ADMIN — LOPERAMIDE HYDROCHLORIDE 2 MG: 2 CAPSULE ORAL at 20:14

## 2022-05-20 RX ADMIN — FAMOTIDINE 20 MG: 20 TABLET ORAL at 20:33

## 2022-05-20 RX ADMIN — ACETAMINOPHEN 650 MG: 325 TABLET ORAL at 18:37

## 2022-05-20 RX ADMIN — SODIUM CHLORIDE, PRESERVATIVE FREE 10 ML: 5 INJECTION INTRAVENOUS at 20:14

## 2022-05-20 RX ADMIN — ENOXAPARIN SODIUM 40 MG: 100 INJECTION SUBCUTANEOUS at 07:46

## 2022-05-20 RX ADMIN — METOPROLOL TARTRATE 5 MG: 5 INJECTION INTRAVENOUS at 16:53

## 2022-05-20 RX ADMIN — SODIUM CHLORIDE, POTASSIUM CHLORIDE, SODIUM LACTATE AND CALCIUM CHLORIDE: 600; 310; 30; 20 INJECTION, SOLUTION INTRAVENOUS at 07:14

## 2022-05-20 RX ADMIN — Medication 1 CAPSULE: at 16:53

## 2022-05-20 RX ADMIN — MEROPENEM 1000 MG: 1 INJECTION, POWDER, FOR SOLUTION INTRAVENOUS at 00:16

## 2022-05-20 ASSESSMENT — PAIN SCALES - GENERAL
PAINLEVEL_OUTOF10: 0
PAINLEVEL_OUTOF10: 3
PAINLEVEL_OUTOF10: 0

## 2022-05-20 ASSESSMENT — PAIN DESCRIPTION - LOCATION: LOCATION: NECK

## 2022-05-20 ASSESSMENT — PAIN DESCRIPTION - DESCRIPTORS: DESCRIPTORS: ACHING;CRAMPING

## 2022-05-20 NOTE — PROGRESS NOTES
[facility]  Office: [address], DO, Ed Young, DO, Dez Signs, DO, Celsa Degroot Blood, DO, Shady Villa MD, Mignon Meckel, MD, Kendra Vernon MD, Adalgisa Cho MD, Armand Vaughn MD, Marion Lloyd MD, Angel Burns MD, Bhavesh Tinajero, DO, Linda Shah, DO, Megan Germain MD,  Alvino Pratt, DO, Kamran Woodruff MD, Digna Mckinney MD, Yousif Lynn MD, Chriss Mello DO, Donna Dey MD, Ignacio Mendes MD, Di Carey MD, Santiago Cohn [facility], [facility], CNP, Tj Ewing, CNP, Mickie Dow, CNP, Ty Mcdonald, CNP, Denise Martel, CNP, Yamila Ortega PA-C, Vanessa [facility]ce[facility], Jb Santizo, CNP, Caryle Poet, CNP, Ernesto Nayak, CNP, Sandeep Mae, CNS, Sandra Fletcher, [facility], Chino Brush, CNP, Sg Dunham, CNP, Hanna Rogers, [facility] [address]    Progress Note    5/20/2022    11:28 AM    Name:   Xiomy Solomon  MRN:     [id number]     Acct:      [de-identified]   Room:   [address] [place] [address] Day:  3  Admit Date:  5/17/2022  1:14 PM    PCP:   CASANDRA Landrum CNP  Code Status:  Full Code    Subjective:     C/C:   Chief Complaint   Patient presents with    Abdominal Pain     Patient seen in follow-up for abdominal pain secondary to acute pancreatitis secondary to alcohol abuse, patient states \"I feel better\"    Interval History Status: improved. Patient states that overall he is feeling significantly better. His abdominal pain is improved dramatically. That said he has developed significant hyperbilirubinemia. The patient is jaundiced on exam with scleral icterus. I suspect that the patient's elevated bilirubin is secondary to his alcohol abuse. PT is not available however I do not suspect that his Madrey score is elevated to the point where he would benefit from glucocorticoids. I will add a PT and initiate steroids if indicated.   At this point in time as liver ultrasound has been completed I am going to advance his diet to a low-fat diet. If PT is significantly elevated with his bilirubin I will start him on steroids for alcohol hepatitis. Further decision making will be made pending clinical response to food, ultrasound along with laboratory data. This is all been discussed with the patient and denies any questions or concerns. Brief History: This is a very pleasant 21-year-old male who presents to the hospital with abdominal pain secondary to alcohol induced pancreatitis. Patient has developed a marked increase in his bilirubin and developed scleral icterus since admission. Work-up is underway. Clinically patient's pancreatitis has improved and he denies any questions or concerns at this point in time. Review of Systems:     Constitutional:  negative for chills, fevers, sweats  Respiratory:  negative for cough, dyspnea on exertion, shortness of breath, wheezing  Cardiovascular: Abdominal pain significantly improved  Gastrointestinal:  negative for abdominal pain, constipation, diarrhea, nausea, vomiting  Neurological:  negative for dizziness, headache    Medications: Allergies:     Allergies   Allergen Reactions    Other      Animals- cats mostly       Current Meds:   Scheduled Meds:    meropenem  1,000 mg IntraVENous Q8H    losartan  50 mg Oral Daily    sodium chloride  80 mL IntraVENous Once    sodium chloride flush  5-40 mL IntraVENous 2 times per day    enoxaparin  40 mg SubCUTAneous Daily    famotidine (PEPCID) injection  20 mg IntraVENous BID     Continuous Infusions:    lactated ringers 250 mL/hr at 05/20/22 1104    sodium chloride 10 mL/hr (05/20/22 0753)     PRN Meds: clonazePAM, simethicone, HYDROmorphone **OR** HYDROmorphone, sodium chloride flush, sodium chloride flush, sodium chloride, ondansetron **OR** ondansetron, polyethylene glycol, acetaminophen **OR** acetaminophen, potassium chloride **OR** potassium alternative oral replacement **OR** potassium chloride    Data:     Past Medical History:   has a past medical history of Anxiety, Asthma, ETOH abuse, Hypertension, and Pancreatitis. Social History:   reports that he quit smoking about 12 months ago. His smoking use included cigarettes. He has never used smokeless tobacco. He reports current alcohol use. He reports that he does not use drugs. Family History:   Family History   Problem Relation Age of Onset    Other Mother         churg wayne syndrome    Emphysema Father     Cancer Maternal Uncle         abdominal area       Vitals:  BP (!) 151/105   Pulse 82   Temp 99 °F (37.2 °C) (Oral)   Resp 16   Ht 5' 9\" (1.753 m)   Wt 197 lb 8.5 oz (89.6 kg)   SpO2 97%   BMI 29.17 kg/m²   Temp (24hrs), Av.9 °F (37.2 °C), Min:98.1 °F (36.7 °C), Max:100.6 °F (38.1 °C)    No results for input(s): POCGLU in the last 72 hours. I/O (24Hr): Intake/Output Summary (Last 24 hours) at 2022 1128  Last data filed at 2022 1112  Gross per 24 hour   Intake 9370.34 ml   Output 6325 ml   Net 3045.34 ml       Labs:  Hematology:  Recent Labs     22  0515 22  0517 22  0509   WBC 8.1 5.4 4.5   RBC 4.96 4.04* 3.76*   HGB 15.4 12.7* 11.9*   HCT 45.3 37.1* 35.1*   MCV 91.3 91.7 93.2   MCH 31.1 31.3 31.5   MCHC 34.1 34.2 33.8   RDW 13.2 13.0 12.8    91* 86*   MPV 9.2 9.7 9.6     Chemistry:  Recent Labs     22  1327 22  1327 22  0515 22  0517 22  0509   *   < > 133* 136 139   K 3.4*   < > 3.7 3.7 4.1   CL 94*   < > 102 103 103   CO2 25   < > 20 26 26   GLUCOSE 154*   < > 138* 110* 112*   BUN 15   < > 7 5* 4*   CREATININE 1.09   < > 0.71 0.60* 0.49*   MG 1.6  --   --   --   --    ANIONGAP 13   < > 11 7* 10   LABGLOM >60   < > >60 >60 >60   GFRAA >60   < > >60 >60 >60   CALCIUM 9.1   < > 7.9* 8.0* 8.4*    < > = values in this interval not displayed.      Recent Labs     22  1327 22  1327 22  0515 22  7024 05/20/22  0509   PROT 8.2   < > 6.3* 5.7* 5.8*   LABALBU 4.5   < > 3.3* 2.9* 2.9*   AST 33   < > 26 26 64*   ALT 32   < > 22 15 37   ALKPHOS 90   < > 66 51 116   BILITOT 1.02   < > 1.08 4.26* 6.84*   BILIDIR 0.26  --   --   --  5.98*   LIPASE 789*  --  1,102*  --   --     < > = values in this interval not displayed. ABG:No results found for: POCPH, PHART, PH, POCPCO2, VRJ9LBW, PCO2, POCPO2, PO2ART, PO2, POCHCO3, GIA0TOX, HCO3, NBEA, PBEA, BEART, BE, THGBART, THB, HSE2URF, GLHG6MYR, I0MFKCFY, O2SAT, FIO2  No results found for: SPECIAL  No results found for: CULTURE    Radiology:  CT ABDOMEN PELVIS WO CONTRAST Additional Contrast? None    Result Date: 5/18/2022  1. Marked interval increase in peripancreatic fat stranding and fluid associated with the acute pancreatitis. The process is not extensive of involving most of the upper abdomen with fluid and fat stranding tracking into the lower abdomen. There is also now new small volume pelvic free fluid. Findings would indicate worsening acute pancreatitis. 2. Unable to assess for pancreatic necrosis in the absence of IV contrast. No pancreatic or peripancreatic gas pockets however enhancement pattern of the pancreas could not be assessed. 3. Mild subsegmental atelectasis posterior lung bases slightly more prominent than previous day. RECOMMENDATIONS: Unavailable     CT ABDOMEN PELVIS W IV CONTRAST Additional Contrast? None    Result Date: 5/17/2022  Acute pancreatitis. Slightly decreased enhancement of the pancreatic tail, suspicious for developing necrosis.        Physical Examination:        General appearance:  alert, cooperative and no distress  Eyes: Positive for scleral icterus  Mental Status:  oriented to person, place and time and normal affect  Lungs:  clear to auscultation bilaterally, normal effort  Heart:  regular rate and rhythm, no murmur  Abdomen:  soft, mild tenderness, nondistended, normal bowel sounds, no masses, hepatomegaly, splenomegaly  Extremities:  no edema, redness, tenderness in the calves  Skin: Jaundiced    Assessment:        Hospital Problems           Last Modified POA    * (Principal) Acute recurrent pancreatitis 5/17/2022 Yes    Hypokalemia 5/17/2022 Yes    ETOH abuse 5/17/2022 Yes          Plan:        1. Alcohol induced pancreatitis  1. Clinically improved  2. Advance to low-fat diet  2. Hyperbilirubinemia  1. Await ultrasound, PTT  2. May require steroids for alcohol induced hepatitis  3. Alcoholism  1. Long discussion with the patient regarding outpatient counseling  2. Alcohol cessation  4. Essential hypertension  1.  Continue Chrissy Faulkner DO  5/20/2022  11:28 AM

## 2022-05-20 NOTE — PLAN OF CARE
Problem: Pain  Goal: Verbalizes/displays adequate comfort level or baseline comfort level  Outcome: Progressing  Pain scale preformed per protocol and pt treated for pain as documented. Education given. Problem: ABCDS Injury Assessment  Goal: Absence of physical injury  Outcome: Progressing  Patient remains free of injury at this time.

## 2022-05-20 NOTE — PLAN OF CARE
Problem: Discharge Planning  Goal: Discharge to home or other facility with appropriate resources  5/19/2022 2030 by Emiliano Cat RN  Outcome: Progressing  Flowsheets (Taken 5/19/2022 2000)  Discharge to home or other facility with appropriate resources:   Identify barriers to discharge with patient and caregiver   Arrange for needed discharge resources and transportation as appropriate  Flowsheets (Taken 5/19/2022 0800)  Discharge to home or other facility with appropriate resources:   Identify barriers to discharge with patient and caregiver   Arrange for needed discharge resources and transportation as appropriate   Identify discharge learning needs (meds, wound care, etc)     Problem: Pain  Goal: Verbalizes/displays adequate comfort level or baseline comfort level  5/19/2022 2030 by Emiliano Cat RN  Outcome: Progressing  Flowsheets (Taken 5/19/2022 2000)  Verbalizes/displays adequate comfort level or baseline comfort level:   Assess pain using appropriate pain scale   Encourage patient to monitor pain and request assistance  Flowsheets (Taken 5/19/2022 0800)  Verbalizes/displays adequate comfort level or baseline comfort level:   Encourage patient to monitor pain and request assistance   Assess pain using appropriate pain scale   Administer analgesics based on type and severity of pain and evaluate response   Implement non-pharmacological measures as appropriate and evaluate response     Problem: ABCDS Injury Assessment  Goal: Absence of physical injury  5/19/2022 2030 by Emiliano Cat RN  Outcome: Progressing  Flowsheets (Taken 5/19/2022 2030)  Absence of Physical Injury: Implement safety measures based on patient assessment  Flowsheets (Taken 5/19/2022 0749)  Absence of Physical Injury: Implement safety measures based on patient assessment     Problem: Safety - Adult  Goal: Free from fall injury  Outcome: Progressing

## 2022-05-21 VITALS
HEIGHT: 69 IN | SYSTOLIC BLOOD PRESSURE: 153 MMHG | TEMPERATURE: 98.1 F | RESPIRATION RATE: 16 BRPM | OXYGEN SATURATION: 95 % | WEIGHT: 197.53 LBS | DIASTOLIC BLOOD PRESSURE: 99 MMHG | HEART RATE: 73 BPM | BODY MASS INDEX: 29.26 KG/M2

## 2022-05-21 PROBLEM — K75.9 HEPATITIS: Status: ACTIVE | Noted: 2022-05-21

## 2022-05-21 LAB
ABSOLUTE EOS #: 0.3 K/UL (ref 0–0.4)
ABSOLUTE LYMPH #: 0.8 K/UL (ref 1–4.8)
ABSOLUTE MONO #: 0.5 K/UL (ref 0.1–1.2)
ALBUMIN SERPL-MCNC: 3.1 G/DL (ref 3.5–5.2)
ALBUMIN/GLOBULIN RATIO: 0.9 (ref 1–2.5)
ALP BLD-CCNC: 139 U/L (ref 40–129)
ALT SERPL-CCNC: 46 U/L (ref 5–41)
ANION GAP SERPL CALCULATED.3IONS-SCNC: 9 MMOL/L (ref 9–17)
AST SERPL-CCNC: 49 U/L
BASOPHILS # BLD: 1 % (ref 0–2)
BASOPHILS ABSOLUTE: 0 K/UL (ref 0–0.2)
BILIRUB SERPL-MCNC: 2.58 MG/DL (ref 0.3–1.2)
BUN BLDV-MCNC: 3 MG/DL (ref 6–20)
CALCIUM SERPL-MCNC: 8.8 MG/DL (ref 8.6–10.4)
CHLORIDE BLD-SCNC: 102 MMOL/L (ref 98–107)
CO2: 28 MMOL/L (ref 20–31)
CREAT SERPL-MCNC: 0.57 MG/DL (ref 0.7–1.2)
EOSINOPHILS RELATIVE PERCENT: 6 % (ref 1–4)
GFR AFRICAN AMERICAN: >60 ML/MIN
GFR NON-AFRICAN AMERICAN: >60 ML/MIN
GFR SERPL CREATININE-BSD FRML MDRD: ABNORMAL ML/MIN/{1.73_M2}
GLUCOSE BLD-MCNC: 117 MG/DL (ref 70–99)
HCT VFR BLD CALC: 34.1 % (ref 41–53)
HEMOGLOBIN: 11.7 G/DL (ref 13.5–17.5)
LIPASE: 117 U/L (ref 13–60)
LYMPHOCYTES # BLD: 18 % (ref 24–44)
MCH RBC QN AUTO: 31.7 PG (ref 26–34)
MCHC RBC AUTO-ENTMCNC: 34.2 G/DL (ref 31–37)
MCV RBC AUTO: 92.6 FL (ref 80–100)
MONOCYTES # BLD: 11 % (ref 2–11)
PDW BLD-RTO: 13.5 % (ref 12.5–15.4)
PLATELET # BLD: 107 K/UL (ref 140–450)
PMV BLD AUTO: 9.8 FL (ref 6–12)
POTASSIUM SERPL-SCNC: 3.6 MMOL/L (ref 3.7–5.3)
RBC # BLD: 3.68 M/UL (ref 4.5–5.9)
SEG NEUTROPHILS: 64 % (ref 36–66)
SEGMENTED NEUTROPHILS ABSOLUTE COUNT: 2.8 K/UL (ref 1.8–7.7)
SODIUM BLD-SCNC: 139 MMOL/L (ref 135–144)
TOTAL PROTEIN: 6.4 G/DL (ref 6.4–8.3)
WBC # BLD: 4.4 K/UL (ref 3.5–11)

## 2022-05-21 PROCEDURE — 99239 HOSP IP/OBS DSCHRG MGMT >30: CPT | Performed by: STUDENT IN AN ORGANIZED HEALTH CARE EDUCATION/TRAINING PROGRAM

## 2022-05-21 PROCEDURE — 80053 COMPREHEN METABOLIC PANEL: CPT

## 2022-05-21 PROCEDURE — 6370000000 HC RX 637 (ALT 250 FOR IP): Performed by: HOSPITALIST

## 2022-05-21 PROCEDURE — 83690 ASSAY OF LIPASE: CPT

## 2022-05-21 PROCEDURE — 85025 COMPLETE CBC W/AUTO DIFF WBC: CPT

## 2022-05-21 PROCEDURE — 36415 COLL VENOUS BLD VENIPUNCTURE: CPT

## 2022-05-21 PROCEDURE — 6370000000 HC RX 637 (ALT 250 FOR IP): Performed by: NURSE PRACTITIONER

## 2022-05-21 PROCEDURE — 6370000000 HC RX 637 (ALT 250 FOR IP): Performed by: STUDENT IN AN ORGANIZED HEALTH CARE EDUCATION/TRAINING PROGRAM

## 2022-05-21 RX ORDER — OXYCODONE HYDROCHLORIDE 5 MG/1
10 TABLET ORAL EVERY 4 HOURS PRN
Status: DISCONTINUED | OUTPATIENT
Start: 2022-05-21 | End: 2022-05-21 | Stop reason: HOSPADM

## 2022-05-21 RX ORDER — OXYCODONE HYDROCHLORIDE 5 MG/1
5 TABLET ORAL EVERY 4 HOURS PRN
Status: DISCONTINUED | OUTPATIENT
Start: 2022-05-21 | End: 2022-05-21 | Stop reason: HOSPADM

## 2022-05-21 RX ORDER — LACTOBACILLUS RHAMNOSUS GG 10B CELL
1 CAPSULE ORAL
Qty: 30 CAPSULE | Refills: 0 | Status: SHIPPED | OUTPATIENT
Start: 2022-05-21

## 2022-05-21 RX ADMIN — Medication 1 CAPSULE: at 10:00

## 2022-05-21 RX ADMIN — LOSARTAN POTASSIUM 50 MG: 50 TABLET, FILM COATED ORAL at 10:00

## 2022-05-21 RX ADMIN — FAMOTIDINE 20 MG: 20 TABLET ORAL at 10:00

## 2022-05-21 ASSESSMENT — PAIN SCALES - WONG BAKER
WONGBAKER_NUMERICALRESPONSE: 0

## 2022-05-21 ASSESSMENT — PAIN SCALES - GENERAL: PAINLEVEL_OUTOF10: 0

## 2022-05-21 NOTE — DISCHARGE SUMMARY
[facility]  Office: [address], DO, Lashon Wayne, DO, Cotyluis Villavicencio, DO, Tsuluis Cabrera Blood, DO, Aditi Ivan MD, Annalisa Haskins MD, Queen Mike MD, Lois Harrington MD, Maliha Solorio MD, Chapis Mehta MD, Farooq Fernandez MD, Zaida Casas, DO, Emily Briceño, DO, Stuart Carpenter MD,  Santiago Helms, DO, Lakia Cook MD, Azucena Dukes MD, Yusef De La Cruz MD, Juan Cam DO, Rakesh Fierro MD, Elicia Mann MD, Giovanni Bell MD, Jon Jensen, [facility], [facility], CNP, Tomasz Combs, CNP, Latasha Munguia, CNP, Waylon Hayes, CNP, Jabari Mae, CNP, Dee Do PA-C, Yariel Cardoso, [facility], Raquel Anne, [facility], [facility], CNP, Bony Hernandez, CNP, Anuj Rosario, CNS, Cristine Durán, [facility], Ernestine Arriola, CNP, Rosana Murillo, CNP, Ace Prim, CNP         [address] [facility]    Discharge Summary     Patient ID: Laine Grace  :  1980   MRN: [id number]     ACCOUNT:  [de-identified]   Patient's PCP: CASANDRA Edward CNP  Admit Date: 2022   Discharge Date: 2022     Length of Stay: 4  Code Status:  Prior  Admitting Physician: No admitting provider for patient encounter. Discharge Physician: Azucena Dukes MD     Active Discharge Diagnoses:     Hospital Problem Lists:  Principal Problem:    Acute recurrent pancreatitis  Active Problems:    Hypokalemia    Hepatitis    HTN (hypertension)    ETOH abuse  Resolved Problems:    * No resolved hospital problems. *      Admission Condition:  poor     Discharged Condition: good    Hospital Stay:     Hospital Course:  Laine Grace is a 42 y.o. male who was admitted for the management of  Acute recurrent pancreatitis , presented to ER with Abdominal Pain    69-year-old male with significant medical history of alcohol use presented to the hospital with abdominal pain. Seen to have acute pancreatitis. Patient was kept n.p.o., IV fluids were given.   Patient also received few doses of meropenem on admission. Patient's abdominal pain gradually improved. He was able to tolerate diet. During hospital stay patient also had episode of elevated bilirubin up to 6 which trended down to 2. Ultrasound liver did not show any gallstones or cholecystitis. CBD was within normal limits. Patient had significant improvement in his symptoms and labs. Decision was made to discharge patient with strong emphasis on quitting alcohol. Patient verbalized understanding. Will repeat hepatic fxn in a week.     No acute complaints on discharge  Exam on discharge  Patient is alert and oriented  Clear to auscultation bilaterally  Regular rate and rhythm  No abdominal tenderness, no organomegaly  No pedal edema      Significant therapeutic interventions: as above    Significant Diagnostic Studies:   Labs / Micro:  CBC:   Lab Results   Component Value Date    WBC 4.4 05/21/2022    RBC 3.68 05/21/2022    HGB 11.7 05/21/2022    HCT 34.1 05/21/2022    MCV 92.6 05/21/2022    MCH 31.7 05/21/2022    MCHC 34.2 05/21/2022    RDW 13.5 05/21/2022     05/21/2022     BMP:    Lab Results   Component Value Date    GLUCOSE 117 05/21/2022     05/21/2022    K 3.6 05/21/2022     05/21/2022    CO2 28 05/21/2022    ANIONGAP 9 05/21/2022    BUN 3 05/21/2022    CREATININE 0.57 05/21/2022    BUNCRER NOT REPORTED 08/14/2021    CALCIUM 8.8 05/21/2022    LABGLOM >60 05/21/2022    GFRAA >60 05/21/2022    GFR      05/21/2022     HFP:    Lab Results   Component Value Date    PROT 6.4 05/21/2022     CMP:    Lab Results   Component Value Date    GLUCOSE 117 05/21/2022     05/21/2022    K 3.6 05/21/2022     05/21/2022    CO2 28 05/21/2022    BUN 3 05/21/2022    CREATININE 0.57 05/21/2022    ANIONGAP 9 05/21/2022    ALKPHOS 139 05/21/2022    ALT 46 05/21/2022    AST 49 05/21/2022    BILITOT 2.58 05/21/2022    LABALBU 3.1 05/21/2022    ALBUMIN 0.9 05/21/2022    LABGLOM >60 05/21/2022 GFRAA >60 05/21/2022    GFR      05/21/2022    PROT 6.4 05/21/2022    CALCIUM 8.8 05/21/2022     PT/INR:    Lab Results   Component Value Date    PROTIME 10.2 05/20/2022    INR 1.0 05/20/2022        Radiology:  CT ABDOMEN PELVIS WO CONTRAST Additional Contrast? None    Result Date: 5/18/2022  1. Marked interval increase in peripancreatic fat stranding and fluid associated with the acute pancreatitis. The process is not extensive of involving most of the upper abdomen with fluid and fat stranding tracking into the lower abdomen. There is also now new small volume pelvic free fluid. Findings would indicate worsening acute pancreatitis. 2. Unable to assess for pancreatic necrosis in the absence of IV contrast. No pancreatic or peripancreatic gas pockets however enhancement pattern of the pancreas could not be assessed. 3. Mild subsegmental atelectasis posterior lung bases slightly more prominent than previous day. RECOMMENDATIONS: Unavailable     CT ABDOMEN PELVIS W IV CONTRAST Additional Contrast? None    Result Date: 5/17/2022  Acute pancreatitis. Slightly decreased enhancement of the pancreatic tail, suspicious for developing necrosis. US LIVER    Result Date: 5/20/2022  Mild hepatomegaly. Increased echogenicity of the liver, which is nonspecific, but can be seen in the setting of steatosis or other causes of hepatocellular disease. No cholelithiasis or acute cholecystitis. Common bile duct is upper limits of normal in caliber. Diffuse peripancreatic inflammatory changes with assessment otherwise suboptimal due to overlying bowel gas. Consultations:    Consults:     Final Specialist Recommendations/Findings:   None      The patient was seen and examined on day of discharge and this discharge summary is in conjunction with any daily progress note from day of discharge. Discharge plan:     Disposition: Home    Physician Follow Up:      Gabrielle Davis, APRN - CNP  [id number] [facility] [address]  [phone]    Schedule an appointment as soon as possible for a visit in 3 days         Requiring Further Evaluation/Follow Up POST HOSPITALIZATION/Incidental Findings: follow up cmp    Diet: regular diet    Activity: As tolerated    Instructions to Patient: avoid alcohol    Discharge Medications:      Medication List      START taking these medications    lactobacillus capsule  Take 1 capsule by mouth 3 times daily (with meals)        CONTINUE taking these medications    albuterol sulfate  (90 Base) MCG/ACT inhaler  Commonly known as: ProAir HFA  Inhale 2 puffs into the lungs every 6 hours as needed for Wheezing     budesonide-formoterol 80-4.5 MCG/ACT Aero  Commonly known as: Symbicort  Inhale 2 puffs into the lungs 2 times daily     clonazePAM 1 MG tablet  Commonly known as: KLONOPIN  Take 1 tablet by mouth 2 times daily as needed for Anxiety for up to 30 days. folic acid 1 MG tablet  Commonly known as: FOLVITE  Take 1 tablet by mouth daily     irbesartan 150 MG tablet  Commonly known as: AVAPRO  TAKE ONE TABLET BY MOUTH DAILY     omeprazole 20 MG delayed release capsule  Commonly known as: PriLOSEC  Take 1 capsule by mouth Daily     Simethicone 80 MG Tabs  Take 80 mg by mouth every 6 hours as needed (bloating) After meals           Where to Get Your Medications      These medications were sent to [facility] [id number] [address] [address]    Phone: [phone]   · lactobacillus capsule         Discharge Procedure Orders   Hepatic Function Panel   Standing Status: Future Standing Exp. Date: 05/21/23   Order Comments: Follow up with pcp       Time Spent on discharge is  34 mins in patient examination, evaluation, counseling as well as medication reconciliation, prescriptions for required medications, discharge plan and follow up.     Electronically signed by   Genie Dakin, MD  5/21/2022  4:01 PM      Thank you CASANDRA Pittman - YARIEL for the opportunity to be involved in this patient's care.

## 2022-05-21 NOTE — PROGRESS NOTES
Security reported that there is not an officer available to give patient belongings including firearm and patient has the option to return to the ER at 1500 this afternoon or an officer can bring patient belonging to patient house pt agreeable to have belongings delivered to house this afternoon gave address and phone number and they will contact patient after 3p this afternoon.  Pt d/c to home

## 2022-05-21 NOTE — PLAN OF CARE
Problem: Discharge Planning  Goal: Discharge to home or other facility with appropriate resources  5/20/2022 2215 by Je El RN  Outcome: Progressing  Flowsheets (Taken 5/20/2022 2000)  Discharge to home or other facility with appropriate resources:   Arrange for needed discharge resources and transportation as appropriate   Identify barriers to discharge with patient and caregiver     Problem: Pain  Goal: Verbalizes/displays adequate comfort level or baseline comfort level  5/20/2022 2215 by Je El RN  Outcome: Progressing     Problem: ABCDS Injury Assessment  Goal: Absence of physical injury  5/20/2022 2215 by Je El RN  Outcome: Progressing  Flowsheets (Taken 5/20/2022 2214)  Absence of Physical Injury: Implement safety measures based on patient assessment     Problem: Safety - Adult  Goal: Free from fall injury  5/20/2022 2215 by Je El RN  Outcome: Progressing  Flowsheets (Taken 5/20/2022 2214)  Free From Fall Injury: Instruct family/caregiver on patient safety

## 2022-05-23 ENCOUNTER — CARE COORDINATION (OUTPATIENT)
Dept: CASE MANAGEMENT | Age: 42
End: 2022-05-23

## 2022-05-23 ENCOUNTER — TELEPHONE (OUTPATIENT)
Dept: PRIMARY CARE CLINIC | Age: 42
End: 2022-05-23

## 2022-05-23 DIAGNOSIS — K85.90 ACUTE RECURRENT PANCREATITIS: Primary | ICD-10-CM

## 2022-05-23 NOTE — CARE COORDINATION
[facility] Transitions Initial Follow Up Call    Call within 2 business days of discharge: Yes    Patient: Oliver Winter Patient : 1980    MRN: <[id number]>  Reason for Admission: Acute recurrent pancreatitis    Discharge Date: 22 RARS: Readmission Risk Score: 10 ( )      Last Discharge [facility]       Complaint Diagnosis Description Type Department Provider    22 Abdominal Pain Alcohol-induced acute pancreatitis with uninfected necrosis . .. ED to Hosp-Admission (Discharged) (ADMITTED) MAIDA Brooke MD; Sheree Haas, ... Spoke with: lindsey he states he is feeling good, continues to have some abdominal discomfort but denies pain, Denies chest pain , SOB, dizziness, fever nausea. Is eating and drinking well normal bowel and bladder elimination. Medications reviewed and taking as directed. 1111 f completed. Declines appointment help has to return to work today needs to figure out schedule. Has no concerns    Facility: [facility]    Non-face-to-face services provided:  Obtained and reviewed discharge summary and/or continuity of care documents  Education of patient/family/caregiver/guardian to support self-management-appoinments , alcohol consumption  Transitions of Care Initial Call    Was this an external facility discharge? No     Challenges to be reviewed by the provider   Additional needs identified to be addressed with provider: No  none             Method of communication with provider : none    Advance Care Planning:   Does patient have an Advance Directive: reviewed and current, reviewed and needs to be updated, not on file; education provided, not on file, decision maker updated and referral to internal ACP facilitator. LPN Care Coordinator contacted the patient by telephone to perform post hospital discharge assessment. Verified name and  with patient as identifiers.  Provided introduction to self, and explanation of the LPN CC role.     LPN CC reviewed discharge instructions, medical action plan and red flags with patient who verbalized understanding. Patient given an opportunity to ask questions and does not have any further questions or concerns at this time. Were discharge instructions available to patient? Yes. Reviewed appropriate site of care based on symptoms and resources available to patient including: PCP  Specialist. The patient agrees to contact the PCP office for questions related to their healthcare. Medication reconciliation was performed with patient, who verbalizes understanding of administration of home medications. Advised obtaining a 90-day supply of all daily and as-needed medications. Was patient discharged with a pulse oximeter? no    LPN CC provided contact information. Plan for follow-up call in 5-7 days based on severity of symptoms and risk factors. Plan for next call: symptom management-abdominal pain      Care Transitions 24 Hour Call    Schedule Follow Up Appointment with PCP: Declined  Do you have a copy of your discharge instructions?: Yes  Do you have all of your prescriptions and are they filled?: Yes  Have you been contacted by a [facility] Pharmacist?: No  Have you scheduled your follow up appointment?: No  Do you feel like you have everything you need to keep you well at home?: Yes  Care Transitions Interventions         Follow Up  No future appointments.     Pedro Gallo LPN

## 2022-05-23 NOTE — TELEPHONE ENCOUNTER
----- Message from Ly Matias LPN sent at 5/60/1943  1:32 PM EDT -----  Regarding: hospital Follow up  Patient discharged from [facility] 5/17-5/21 for Acute recurrent pancreatitis. Needs 7 day Follow up scheduled.  Thank you

## 2022-05-27 ENCOUNTER — CARE COORDINATION (OUTPATIENT)
Dept: CASE MANAGEMENT | Age: 42
End: 2022-05-27

## 2022-05-27 NOTE — CARE COORDINATION
[facility] Transitions Follow Up Call    2022    Patient: Yoko Pump  Patient : 1980    MRN: <[id number]>  Reason for Admission: Acute recurrent pancreatitis    Discharge Date: 22 RARS: Readmission Risk Score: 10 ( )         Spoke with: Called to speak with patient for update with transition of care. Left HIPPA compliant voice message with contact information [phone] for a call  Back with an update. Care Transitions Subsequent and Final Call    Subsequent and Final Calls  Care Transitions Interventions  Other Interventions: Follow Up  No future appointments.     Ly Matias LPN

## 2022-06-01 ENCOUNTER — CARE COORDINATION (OUTPATIENT)
Dept: OTHER | Facility: CLINIC | Age: 42
End: 2022-06-01

## 2022-06-01 NOTE — CARE COORDINATION
[facility] Transitions Follow Up Call    2022    Patient: Mariann Puckett  Patient : 1980   MRN: [id number]  Reason for Admission:Alcohol induced pancreatitis  Discharge Date: 22 RARS: Readmission Risk Score: 10 ( )         Spoke with:Vicente    Spoke to Tania Dao who stated he is doing better, taking probiotic tid and it is helping with normalizing bowels. Stated he is trying to eat healthier and sticking to routine. Pt has not followed up with PCP for HFU yet, stated he spoke to office and plans on scheduling office visit, has been busy at work after taking time off with hospital stay. Needs to be reviewed by the provider   Additional needs identified to be addressed with provider: No  none             Method of communication with provider : none      Care Transition Nurse contacted the patient by telephone to follow up after admission on 22. Verified name and  with patient as identifiers. CTN reviewed discharge instructions, medical action plan and red flags with patient and discussed any barriers to care and/or understanding of plan of care after discharge. Discussed appropriate site of care based on symptoms and resources available to patient including: PCP  When to call [phone]. The patient agrees to contact the PCP office for questions related to their healthcare. Patients top risk factors for readmission: ineffective coping  Interventions to address risk factors: Obtained and reviewed discharge summary and/or continuity of care documents      CTN provided contact information for future needs. Plan for follow-up call in 5-7 days based on severity of symptoms and risk factors. Plan for next call: symptom management-abd pain, bowels moving regular? routine call  follow up appointment-Needs PCP f/u appt.         Care Transitions Subsequent and Final Call    Subsequent and Final Calls  Do you have any ongoing symptoms?: No  Have your medications changed?: No  Do you have any questions related to your medications?: No  Do you currently have any active services?: No  Do you have any needs or concerns that I can assist you with?: No  Identified Barriers: None  Care Transitions Interventions  Other Interventions: Follow Up  No future appointments.     April Dailey RN

## 2022-06-07 ENCOUNTER — CARE COORDINATION (OUTPATIENT)
Dept: CASE MANAGEMENT | Age: 42
End: 2022-06-07

## 2022-06-07 NOTE — CARE COORDINATION
[facility] Transitions Follow Up Call    2022    Patient: Heydi Page  Patient : 1980   MRN: <[id number]>  Reason for Admission: Alcohol induced pancreatitis  Discharge Date: 22 RARS: Readmission Risk Score: 10 ( )         Spoke with: Called to speak with patient for update with transition of care. Left HIPPA compliant voice message with contact information [phone] for a call  Back with an update. Care Transitions Subsequent and Final Call    Subsequent and Final Calls  Do you have any ongoing symptoms?: No  Have your medications changed?: No  Do you have any questions related to your medications?: No  Do you currently have any active services?: No  Do you have any needs or concerns that I can assist you with?: No  Identified Barriers: Other  Care Transitions Interventions  Other Interventions: Follow Up  No future appointments.     Jose Enrique Fermin, RADHA

## 2023-02-22 SDOH — HEALTH STABILITY: PHYSICAL HEALTH: ON AVERAGE, HOW MANY DAYS PER WEEK DO YOU ENGAGE IN MODERATE TO STRENUOUS EXERCISE (LIKE A BRISK WALK)?: 3 DAYS

## 2023-02-22 SDOH — HEALTH STABILITY: PHYSICAL HEALTH: ON AVERAGE, HOW MANY MINUTES DO YOU ENGAGE IN EXERCISE AT THIS LEVEL?: 40 MIN

## 2023-02-24 ENCOUNTER — HOSPITAL ENCOUNTER (OUTPATIENT)
Age: 43
Setting detail: SPECIMEN
Discharge: HOME | End: 2023-02-24

## 2023-02-24 ENCOUNTER — OFFICE VISIT (OUTPATIENT)
Dept: PRIMARY CARE CLINIC | Age: 43
End: 2023-02-24
Payer: COMMERCIAL

## 2023-02-24 VITALS
SYSTOLIC BLOOD PRESSURE: 152 MMHG | OXYGEN SATURATION: 99 % | WEIGHT: 209 LBS | HEART RATE: 83 BPM | RESPIRATION RATE: 12 BRPM | BODY MASS INDEX: 30.96 KG/M2 | DIASTOLIC BLOOD PRESSURE: 96 MMHG | HEIGHT: 69 IN

## 2023-02-24 DIAGNOSIS — Z13.1 SCREENING FOR DIABETES MELLITUS: ICD-10-CM

## 2023-02-24 DIAGNOSIS — Z79.899 MEDICATION MANAGEMENT: ICD-10-CM

## 2023-02-24 DIAGNOSIS — Z12.5 SCREENING FOR PROSTATE CANCER: ICD-10-CM

## 2023-02-24 DIAGNOSIS — R53.83 OTHER FATIGUE: ICD-10-CM

## 2023-02-24 DIAGNOSIS — R14.0 BLOATING: ICD-10-CM

## 2023-02-24 DIAGNOSIS — Z13.0 SCREENING FOR DEFICIENCY ANEMIA: ICD-10-CM

## 2023-02-24 DIAGNOSIS — F41.9 ANXIETY: Primary | ICD-10-CM

## 2023-02-24 DIAGNOSIS — Z13.220 SCREENING FOR LIPID DISORDERS: ICD-10-CM

## 2023-02-24 DIAGNOSIS — I10 ESSENTIAL HYPERTENSION: ICD-10-CM

## 2023-02-24 LAB
ALBUMIN SERPL-MCNC: 4.9 G/DL (ref 3.5–5.2)
ALBUMIN/GLOBULIN RATIO: 1.8 (ref 1–2.5)
ALP SERPL-CCNC: 64 U/L (ref 40–129)
ALT SERPL-CCNC: 25 U/L (ref 5–41)
ANION GAP SERPL CALCULATED.3IONS-SCNC: 15 MMOL/L (ref 9–17)
AST SERPL-CCNC: 25 U/L
BILIRUB SERPL-MCNC: 0.3 MG/DL (ref 0.3–1.2)
BUN SERPL-MCNC: 12 MG/DL (ref 6–20)
CALCIUM SERPL-MCNC: 9.5 MG/DL (ref 8.6–10.4)
CHLORIDE SERPL-SCNC: 102 MMOL/L (ref 98–107)
CHOLEST SERPL-MCNC: 213 MG/DL
CHOLESTEROL/HDL RATIO: 4.1
CO2 SERPL-SCNC: 25 MMOL/L (ref 20–31)
CREAT SERPL-MCNC: 0.88 MG/DL (ref 0.7–1.2)
EST. AVERAGE GLUCOSE BLD GHB EST-MCNC: 117 MG/DL
GFR SERPL CREATININE-BSD FRML MDRD: >60 ML/MIN/1.73M2
GLUCOSE SERPL-MCNC: 93 MG/DL (ref 70–99)
HBA1C MFR BLD: 5.7 % (ref 4–6)
HCT VFR BLD AUTO: 44.5 % (ref 40.7–50.3)
HDLC SERPL-MCNC: 52 MG/DL
HGB BLD-MCNC: 14.7 G/DL (ref 13–17)
LDLC SERPL CALC-MCNC: 125 MG/DL (ref 0–130)
MCH RBC QN AUTO: 30.4 PG (ref 25.2–33.5)
MCHC RBC AUTO-ENTMCNC: 33 G/DL (ref 28.4–34.8)
MCV RBC AUTO: 92.1 FL (ref 82.6–102.9)
NRBC AUTOMATED: 0 PER 100 WBC
PDW BLD-RTO: 12.3 % (ref 11.8–14.4)
PLATELET # BLD AUTO: 192 K/UL (ref 138–453)
PMV BLD AUTO: 11.7 FL (ref 8.1–13.5)
POTASSIUM SERPL-SCNC: 4.5 MMOL/L (ref 3.7–5.3)
PROSTATE SPECIFIC ANTIGEN: 0.37 NG/ML
PROT SERPL-MCNC: 7.7 G/DL (ref 6.4–8.3)
RBC # BLD: 4.83 M/UL (ref 4.21–5.77)
SODIUM SERPL-SCNC: 142 MMOL/L (ref 135–144)
TESTOST SERPL-MCNC: 484 NG/DL (ref 220–1000)
TRIGL SERPL-MCNC: 179 MG/DL
TSH SERPL-ACNC: 1.94 UIU/ML (ref 0.3–5)
WBC # BLD AUTO: 7 K/UL (ref 3.5–11.3)

## 2023-02-24 PROCEDURE — 3077F SYST BP >= 140 MM HG: CPT | Performed by: NURSE PRACTITIONER

## 2023-02-24 PROCEDURE — 99204 OFFICE O/P NEW MOD 45 MIN: CPT | Performed by: NURSE PRACTITIONER

## 2023-02-24 PROCEDURE — 3080F DIAST BP >= 90 MM HG: CPT | Performed by: NURSE PRACTITIONER

## 2023-02-24 RX ORDER — CLONAZEPAM 1 MG/1
1 TABLET ORAL 2 TIMES DAILY PRN
Qty: 60 TABLET | Refills: 0 | Status: SHIPPED | OUTPATIENT
Start: 2023-02-24 | End: 2023-03-26

## 2023-02-24 RX ORDER — CLONAZEPAM 1 MG/1
1 TABLET ORAL 2 TIMES DAILY PRN
Qty: 60 TABLET | Refills: 1 | Status: CANCELLED | OUTPATIENT
Start: 2023-02-24 | End: 2023-03-26

## 2023-02-24 RX ORDER — IRBESARTAN 150 MG/1
TABLET ORAL
Qty: 90 TABLET | Refills: 1 | Status: SHIPPED | OUTPATIENT
Start: 2023-02-24

## 2023-02-24 RX ORDER — BLOOD PRESSURE TEST KIT
1 KIT MISCELLANEOUS DAILY
Qty: 1 KIT | Refills: 0 | Status: SHIPPED | OUTPATIENT
Start: 2023-02-24

## 2023-02-24 SDOH — ECONOMIC STABILITY: FOOD INSECURITY: WITHIN THE PAST 12 MONTHS, YOU WORRIED THAT YOUR FOOD WOULD RUN OUT BEFORE YOU GOT MONEY TO BUY MORE.: NEVER TRUE

## 2023-02-24 SDOH — ECONOMIC STABILITY: INCOME INSECURITY: HOW HARD IS IT FOR YOU TO PAY FOR THE VERY BASICS LIKE FOOD, HOUSING, MEDICAL CARE, AND HEATING?: NOT HARD AT ALL

## 2023-02-24 SDOH — ECONOMIC STABILITY: FOOD INSECURITY: WITHIN THE PAST 12 MONTHS, THE FOOD YOU BOUGHT JUST DIDN'T LAST AND YOU DIDN'T HAVE MONEY TO GET MORE.: NEVER TRUE

## 2023-02-24 SDOH — ECONOMIC STABILITY: HOUSING INSECURITY
IN THE LAST 12 MONTHS, WAS THERE A TIME WHEN YOU DID NOT HAVE A STEADY PLACE TO SLEEP OR SLEPT IN A SHELTER (INCLUDING NOW)?: NO

## 2023-02-24 ASSESSMENT — PATIENT HEALTH QUESTIONNAIRE - PHQ9
SUM OF ALL RESPONSES TO PHQ QUESTIONS 1-9: 0
SUM OF ALL RESPONSES TO PHQ9 QUESTIONS 1 & 2: 0
1. LITTLE INTEREST OR PLEASURE IN DOING THINGS: 0
2. FEELING DOWN, DEPRESSED OR HOPELESS: 0
SUM OF ALL RESPONSES TO PHQ QUESTIONS 1-9: 0

## 2023-02-24 ASSESSMENT — ENCOUNTER SYMPTOMS
ABDOMINAL PAIN: 0
BACK PAIN: 0
SHORTNESS OF BREATH: 0
COUGH: 0

## 2023-02-24 NOTE — PROGRESS NOTES
[address] PRIMARY CARE  [address] [facility] [address]  [address] [address]  Dept: [phone]  Dept Fax: [phone]    Gloria Moody is a 36 y.o. male who presentstoday for his medical conditions/complaints as noted below. Gloria Moody is c/o of  Chief Complaint   Patient presents with    Insomnia     Is in law enforcement .     Anxiety    Urinary Frequency     During the night     Discuss Labs     Would like Testosterone levels checked            HPI:     Presents for new patient visit/est care  Referred by the [facility]  BP elevated, has not been taking meds consistently  Asymptomatic with readings  Willing to resume med use and monitor BP at home  Has gained 12lb since LOV 2022  Working as a , swing shift  Also receiving training as EMT    C/o increase in fatigue, unsure if related to schedule  Willing to update labs  Has a hard time sleeping, hx of anxiety  Managed with use of clonazepam prn  His wife passed away 8 years ago, this is when he began having issues with anxiety/BP    C/o noted urinary frequency, no dysuria    Willing to update annual labs    Denies any other problems/concerns      No results found for: LABA1C          ( goal A1C is < 7)   No results found for: LABMICR  LDL Cholesterol (mg/dL)   Date Value   2021 85       (goal LDL is <100)   AST (U/L)   Date Value   2022 49 (H)     ALT (U/L)   Date Value   2022 46 (H)     BUN (mg/dL)   Date Value   2022 3 (L)     BP Readings from Last 3 Encounters:   23 (!) 152/96   22 (!) 153/99   21 (!) 150/102          ([id number])    Past Medical History:   Diagnosis Date    Anxiety     Asthma     ETOH abuse     Hypertension     Pancreatitis 2021      Past Surgical History:   Procedure Laterality Date    CARDIOVASCULAR STRESS TEST      > 5 yrs ago    CYST INCISION AND DRAINAGE      spider bite    KNEE SURGERY Right        Family History   Problem Relation Age of Onset    Other Mother         churg wayne syndrome    Emphysema Father     Cancer Maternal Uncle         abdominal area          Social History     Tobacco Use    Smoking status: Former     Types: Cigarettes     Quit date: 2021     Years since quittin.8    Smokeless tobacco: Never   Substance Use Topics    Alcohol use: Not Currently     Comment: daily beer or wine (he says just one usually)      Current Outpatient Medications   Medication Sig Dispense Refill    Simethicone 80 MG TABS Take 80 mg by mouth every 6 hours as needed (bloating) After meals 90 tablet 1    irbesartan (AVAPRO) 150 MG tablet Take 1 tablet by mouth daily 90 tablet 1    Blood Pressure KIT 1 kit by Does not apply route daily 1 kit 0    clonazePAM (KLONOPIN) 1 MG tablet Take 1 tablet by mouth 2 times daily as needed for Anxiety for up to 30 days. 60 tablet 0    omeprazole (PRILOSEC) 20 MG delayed release capsule Take 1 capsule by mouth Daily 90 capsule 1    folic acid (FOLVITE) 1 MG tablet Take 1 tablet by mouth daily 30 tablet 0     No current facility-administered medications for this visit. Allergies   Allergen Reactions    Other      Animals- cats mostly       Health Maintenance   Topic Date Due    COVID-19 Vaccine (1) Never done    Depression Screen  Never done    Diabetes screen  Never done    DTaP/Tdap/Td vaccine (1 - Tdap) 2023 (Originally 1998)    Flu vaccine (1) 2023 (Originally 2022)    Lipids  2026    Pneumococcal 0-64 years Vaccine  Completed    Hepatitis C screen  Completed    Hepatitis A vaccine  Aged Out    Hib vaccine  Aged Out    Meningococcal (ACWY) vaccine  Aged Out    HIV screen  Discontinued       Subjective:      Review of Systems   Constitutional:  Positive for fatigue. Negative for chills and fever. HENT:  Negative for congestion. Eyes:  Negative for visual disturbance. Respiratory:  Negative for cough and shortness of breath. Cardiovascular:  Negative for chest pain and palpitations. Gastrointestinal:  Negative for abdominal pain. Genitourinary:  Negative for difficulty urinating and dysuria. Musculoskeletal:  Negative for arthralgias and back pain. Neurological:  Negative for dizziness and headaches. Psychiatric/Behavioral:  Positive for sleep disturbance. Negative for self-injury and suicidal ideas. The patient is nervous/anxious. Objective:     Physical Exam  Vitals and nursing note reviewed. Constitutional:       Appearance: He is well-developed. HENT:      Head: Normocephalic and atraumatic. Eyes:      Pupils: Pupils are equal, round, and reactive to light. Cardiovascular:      Rate and Rhythm: Normal rate and regular rhythm. Heart sounds: Normal heart sounds. Pulmonary:      Effort: Pulmonary effort is normal.      Breath sounds: Normal breath sounds. Abdominal:      General: Bowel sounds are normal.      Palpations: Abdomen is soft. Tenderness: There is no abdominal tenderness. Musculoskeletal:         General: Normal range of motion. Cervical back: Normal range of motion. Skin:     General: Skin is warm and dry. Neurological:      Mental Status: He is alert and oriented to person, place, and time. Psychiatric:         Behavior: Behavior normal.         Thought Content: Thought content normal.         Judgment: Judgment normal.     BP (!) 152/96   Pulse 83   Resp 12   Ht 5' 9\" (1.753 m)   Wt 209 lb (94.8 kg)   SpO2 99%   BMI 30.86 kg/m²     Assessment:       Diagnosis Orders   1. Anxiety  clonazePAM (KLONOPIN) 1 MG tablet      2. Medication management  clonazePAM (KLONOPIN) 1 MG tablet      3. Bloating  Simethicone 80 MG TABS      4. Essential hypertension  irbesartan (AVAPRO) 150 MG tablet      5. Other fatigue  Testosterone    TSH with Reflex      6. Screening for diabetes mellitus  Hemoglobin A1C    Comprehensive Metabolic Panel      7.  Screening for deficiency anemia CBC      8. Screening for prostate cancer  PSA Screening      9. Screening for lipid disorders  Lipid Panel                Plan:      Return in about 6 months (around 2023) for annual exam.    Anxiety- Stable. Continue current meds. Follow up in six months for recheck/earlier if needed. HTN- Renewed previous rx. Rx given for BP kit. Check BP at home and notify office if consistently greater than 140/90 with med compliance. Follow up in six months for annual/earlier if needed. HM- Continue diet/exercise. Continue current meds. Rx given for annual labs. Follow up in six months for recheck/earlier if needed. Orders Placed This Encounter   Procedures    Testosterone     Standing Status:   Future     Standing Expiration Date:   2024    TSH with Reflex     Standing Status:   Future     Standing Expiration Date:   2024    Hemoglobin A1C     Standing Status:   Future     Standing Expiration Date:   2024    Comprehensive Metabolic Panel     Standing Status:   Future     Standing Expiration Date:   2024    CBC     Standing Status:   Future     Standing Expiration Date:   2024    PSA Screening     Standing Status:   Future     Standing Expiration Date:   2024    Lipid Panel     Standing Status:   Future     Standing Expiration Date:   2024     Order Specific Question:   Is Patient Fasting?/# of Hours     Answer:   12        Orders Placed This Encounter   Medications    Simethicone 80 MG TABS     Sig: Take 80 mg by mouth every 6 hours as needed (bloating) After meals     Dispense:  90 tablet     Refill:  1    irbesartan (AVAPRO) 150 MG tablet     Sig: Take 1 tablet by mouth daily     Dispense:  90 tablet     Refill:  1    Blood Pressure KIT     Si kit by Does not apply route daily     Dispense:  1 kit     Refill:  0    clonazePAM (KLONOPIN) 1 MG tablet     Sig: Take 1 tablet by mouth 2 times daily as needed for Anxiety for up to 30 days.      Dispense:  60 tablet     Refill:  0 Patient given educational materials - see patient instructions. Discussed use, benefit, and side effects of prescribed medications. All patientquestions answered. Pt voiced understanding. Reviewed health maintenance. Instructedto continue current medications, diet and exercise. Patient agreed with treatmentplan. Follow up as directed.      Electronicallysigned by CASANDRA Stark CNP on 2/24/2023 at 8:59 AM

## 2023-05-12 DIAGNOSIS — R14.0 BLOATING: ICD-10-CM

## 2023-05-12 DIAGNOSIS — Z79.899 MEDICATION MANAGEMENT: ICD-10-CM

## 2023-05-12 DIAGNOSIS — F41.9 ANXIETY: ICD-10-CM

## 2023-05-12 DIAGNOSIS — I10 ESSENTIAL HYPERTENSION: ICD-10-CM

## 2023-05-12 RX ORDER — CLONAZEPAM 1 MG/1
1 TABLET ORAL 2 TIMES DAILY PRN
Qty: 60 TABLET | Refills: 0 | Status: SHIPPED | OUTPATIENT
Start: 2023-05-12 | End: 2023-06-11

## 2023-05-12 RX ORDER — IRBESARTAN 150 MG/1
TABLET ORAL
Qty: 90 TABLET | Refills: 1 | Status: SHIPPED | OUTPATIENT
Start: 2023-05-12

## 2023-11-15 DIAGNOSIS — Z79.899 MEDICATION MANAGEMENT: ICD-10-CM

## 2023-11-15 DIAGNOSIS — F41.9 ANXIETY: ICD-10-CM

## 2023-11-15 RX ORDER — CLONAZEPAM 1 MG/1
1 TABLET ORAL 2 TIMES DAILY PRN
Qty: 60 TABLET | Refills: 0 | Status: SHIPPED | OUTPATIENT
Start: 2023-11-15 | End: 2023-12-15

## 2023-12-07 ENCOUNTER — OFFICE VISIT (OUTPATIENT)
Dept: PRIMARY CARE CLINIC | Age: 43
End: 2023-12-07
Payer: COMMERCIAL

## 2023-12-07 VITALS
HEIGHT: 69 IN | RESPIRATION RATE: 13 BRPM | SYSTOLIC BLOOD PRESSURE: 118 MMHG | HEART RATE: 76 BPM | WEIGHT: 188.6 LBS | BODY MASS INDEX: 27.93 KG/M2 | DIASTOLIC BLOOD PRESSURE: 80 MMHG | OXYGEN SATURATION: 95 %

## 2023-12-07 DIAGNOSIS — Z00.00 ENCOUNTER FOR GENERAL ADULT MEDICAL EXAMINATION W/O ABNORMAL FINDINGS: Primary | ICD-10-CM

## 2023-12-07 PROCEDURE — 3074F SYST BP LT 130 MM HG: CPT | Performed by: NURSE PRACTITIONER

## 2023-12-07 PROCEDURE — 99396 PREV VISIT EST AGE 40-64: CPT | Performed by: NURSE PRACTITIONER

## 2023-12-07 PROCEDURE — 3079F DIAST BP 80-89 MM HG: CPT | Performed by: NURSE PRACTITIONER

## 2023-12-07 SDOH — ECONOMIC STABILITY: FOOD INSECURITY: WITHIN THE PAST 12 MONTHS, YOU WORRIED THAT YOUR FOOD WOULD RUN OUT BEFORE YOU GOT MONEY TO BUY MORE.: NEVER TRUE

## 2023-12-07 SDOH — ECONOMIC STABILITY: FOOD INSECURITY: WITHIN THE PAST 12 MONTHS, THE FOOD YOU BOUGHT JUST DIDN'T LAST AND YOU DIDN'T HAVE MONEY TO GET MORE.: NEVER TRUE

## 2023-12-07 SDOH — ECONOMIC STABILITY: INCOME INSECURITY: HOW HARD IS IT FOR YOU TO PAY FOR THE VERY BASICS LIKE FOOD, HOUSING, MEDICAL CARE, AND HEATING?: NOT HARD AT ALL

## 2023-12-07 ASSESSMENT — PATIENT HEALTH QUESTIONNAIRE - PHQ9
SUM OF ALL RESPONSES TO PHQ QUESTIONS 1-9: 0
2. FEELING DOWN, DEPRESSED OR HOPELESS: 0
1. LITTLE INTEREST OR PLEASURE IN DOING THINGS: 0
SUM OF ALL RESPONSES TO PHQ9 QUESTIONS 1 & 2: 0
SUM OF ALL RESPONSES TO PHQ QUESTIONS 1-9: 0

## 2023-12-07 ASSESSMENT — ENCOUNTER SYMPTOMS
COUGH: 0
BACK PAIN: 0
SHORTNESS OF BREATH: 0
ABDOMINAL PAIN: 0

## 2024-02-06 DIAGNOSIS — I10 ESSENTIAL HYPERTENSION: ICD-10-CM

## 2024-02-06 RX ORDER — IRBESARTAN 150 MG/1
TABLET ORAL
Qty: 90 TABLET | Refills: 1 | Status: SHIPPED | OUTPATIENT
Start: 2024-02-06

## 2024-02-09 DIAGNOSIS — F41.9 ANXIETY: ICD-10-CM

## 2024-02-09 DIAGNOSIS — Z79.899 MEDICATION MANAGEMENT: ICD-10-CM

## 2024-02-09 RX ORDER — CLONAZEPAM 1 MG/1
1 TABLET ORAL 2 TIMES DAILY PRN
Qty: 60 TABLET | Refills: 0 | Status: SHIPPED | OUTPATIENT
Start: 2024-02-09 | End: 2024-03-10

## 2024-08-11 DIAGNOSIS — I10 ESSENTIAL HYPERTENSION: ICD-10-CM

## 2024-08-12 RX ORDER — IRBESARTAN 150 MG/1
TABLET ORAL
Qty: 90 TABLET | Refills: 1 | Status: SHIPPED | OUTPATIENT
Start: 2024-08-12

## 2024-10-24 DIAGNOSIS — F41.9 ANXIETY: ICD-10-CM

## 2024-10-24 DIAGNOSIS — Z79.899 MEDICATION MANAGEMENT: ICD-10-CM

## 2024-10-24 DIAGNOSIS — I10 ESSENTIAL HYPERTENSION: ICD-10-CM

## 2024-10-24 RX ORDER — CLONAZEPAM 1 MG/1
1 TABLET ORAL 2 TIMES DAILY PRN
Qty: 60 TABLET | Refills: 0 | Status: SHIPPED | OUTPATIENT
Start: 2024-10-24 | End: 2024-11-23

## 2024-10-24 RX ORDER — IRBESARTAN 150 MG/1
TABLET ORAL
Qty: 90 TABLET | Refills: 1 | Status: SHIPPED | OUTPATIENT
Start: 2024-10-24

## 2024-11-12 ASSESSMENT — PATIENT HEALTH QUESTIONNAIRE - PHQ9
1. LITTLE INTEREST OR PLEASURE IN DOING THINGS: NOT AT ALL
SUM OF ALL RESPONSES TO PHQ QUESTIONS 1-9: 0
SUM OF ALL RESPONSES TO PHQ9 QUESTIONS 1 & 2: 0
SUM OF ALL RESPONSES TO PHQ QUESTIONS 1-9: 0
2. FEELING DOWN, DEPRESSED OR HOPELESS: NOT AT ALL
SUM OF ALL RESPONSES TO PHQ QUESTIONS 1-9: 0
SUM OF ALL RESPONSES TO PHQ QUESTIONS 1-9: 0
1. LITTLE INTEREST OR PLEASURE IN DOING THINGS: NOT AT ALL
SUM OF ALL RESPONSES TO PHQ9 QUESTIONS 1 & 2: 0
2. FEELING DOWN, DEPRESSED OR HOPELESS: NOT AT ALL

## 2024-11-15 ENCOUNTER — OFFICE VISIT (OUTPATIENT)
Dept: PRIMARY CARE CLINIC | Age: 44
End: 2024-11-15

## 2024-11-15 ENCOUNTER — HOSPITAL ENCOUNTER (OUTPATIENT)
Age: 44
Setting detail: SPECIMEN
Discharge: HOME | End: 2024-11-15

## 2024-11-15 VITALS
OXYGEN SATURATION: 98 % | HEART RATE: 74 BPM | RESPIRATION RATE: 14 BRPM | SYSTOLIC BLOOD PRESSURE: 124 MMHG | BODY MASS INDEX: 28.17 KG/M2 | DIASTOLIC BLOOD PRESSURE: 72 MMHG | HEIGHT: 69 IN | WEIGHT: 190.2 LBS

## 2024-11-15 DIAGNOSIS — R53.83 OTHER FATIGUE: ICD-10-CM

## 2024-11-15 DIAGNOSIS — Z13.0 SCREENING FOR DEFICIENCY ANEMIA: ICD-10-CM

## 2024-11-15 DIAGNOSIS — Z12.5 SCREENING FOR PROSTATE CANCER: ICD-10-CM

## 2024-11-15 DIAGNOSIS — Z13.220 SCREENING FOR LIPID DISORDERS: ICD-10-CM

## 2024-11-15 DIAGNOSIS — Z13.1 SCREENING FOR DIABETES MELLITUS: ICD-10-CM

## 2024-11-15 DIAGNOSIS — F41.9 ANXIETY: Primary | ICD-10-CM

## 2024-11-15 LAB
ALBUMIN SERPL-MCNC: 4.7 G/DL (ref 3.5–5.2)
ALBUMIN/GLOB SERPL: 1.6 {RATIO} (ref 1–2.5)
ALP SERPL-CCNC: 46 U/L (ref 40–129)
ALT SERPL-CCNC: 22 U/L (ref 10–50)
ANION GAP SERPL CALCULATED.3IONS-SCNC: 11 MMOL/L (ref 9–16)
AST SERPL-CCNC: 32 U/L (ref 10–50)
BILIRUB SERPL-MCNC: 0.4 MG/DL (ref 0–1.2)
BUN SERPL-MCNC: 20 MG/DL (ref 6–20)
CALCIUM SERPL-MCNC: 10.1 MG/DL (ref 8.6–10.4)
CHLORIDE SERPL-SCNC: 101 MMOL/L (ref 98–107)
CHOLEST SERPL-MCNC: 197 MG/DL (ref 0–199)
CHOLESTEROL/HDL RATIO: 3.5
CO2 SERPL-SCNC: 26 MMOL/L (ref 20–31)
CREAT SERPL-MCNC: 0.9 MG/DL (ref 0.7–1.2)
ERYTHROCYTE [DISTWIDTH] IN BLOOD BY AUTOMATED COUNT: 12.9 % (ref 11.8–14.4)
EST. AVERAGE GLUCOSE BLD GHB EST-MCNC: 111 MG/DL
GFR, ESTIMATED: >90 ML/MIN/1.73M2
GLUCOSE SERPL-MCNC: 101 MG/DL (ref 74–99)
HBA1C MFR BLD: 5.5 % (ref 4–6)
HCT VFR BLD AUTO: 42.2 % (ref 40.7–50.3)
HDLC SERPL-MCNC: 56 MG/DL
HGB BLD-MCNC: 13.5 G/DL (ref 13–17)
LDLC SERPL CALC-MCNC: 115 MG/DL (ref 0–100)
MCH RBC QN AUTO: 31.2 PG (ref 25.2–33.5)
MCHC RBC AUTO-ENTMCNC: 32 G/DL (ref 28.4–34.8)
MCV RBC AUTO: 97.5 FL (ref 82.6–102.9)
NRBC BLD-RTO: 0 PER 100 WBC
PLATELET # BLD AUTO: 226 K/UL (ref 138–453)
PMV BLD AUTO: 11.8 FL (ref 8.1–13.5)
POTASSIUM SERPL-SCNC: 4.8 MMOL/L (ref 3.7–5.3)
PROT SERPL-MCNC: 7.6 G/DL (ref 6.6–8.7)
PSA SERPL-MCNC: 0.4 NG/ML (ref 0–4)
RBC # BLD AUTO: 4.33 M/UL (ref 4.21–5.77)
SODIUM SERPL-SCNC: 138 MMOL/L (ref 136–145)
TESTOST SERPL-MCNC: 958 NG/DL (ref 249–836)
TRIGL SERPL-MCNC: 131 MG/DL
TSH SERPL DL<=0.05 MIU/L-ACNC: 2.5 UIU/ML (ref 0.27–4.2)
VLDLC SERPL CALC-MCNC: 26 MG/DL (ref 1–30)
WBC OTHER # BLD: 7.9 K/UL (ref 3.5–11.3)

## 2024-11-15 SDOH — ECONOMIC STABILITY: FOOD INSECURITY: WITHIN THE PAST 12 MONTHS, THE FOOD YOU BOUGHT JUST DIDN'T LAST AND YOU DIDN'T HAVE MONEY TO GET MORE.: NEVER TRUE

## 2024-11-15 SDOH — ECONOMIC STABILITY: FOOD INSECURITY: WITHIN THE PAST 12 MONTHS, YOU WORRIED THAT YOUR FOOD WOULD RUN OUT BEFORE YOU GOT MONEY TO BUY MORE.: NEVER TRUE

## 2024-11-15 SDOH — ECONOMIC STABILITY: INCOME INSECURITY: HOW HARD IS IT FOR YOU TO PAY FOR THE VERY BASICS LIKE FOOD, HOUSING, MEDICAL CARE, AND HEATING?: NOT HARD AT ALL

## 2024-11-15 ASSESSMENT — ENCOUNTER SYMPTOMS
COUGH: 0
BACK PAIN: 0
ABDOMINAL PAIN: 0
SHORTNESS OF BREATH: 0

## 2024-11-15 NOTE — PROGRESS NOTES
[facility]  [facility] PRIMARY CARE  [address] [place] [address] [address]  [address]  [facility] [address]  Dept: [phone]  Dept Fax: [phone]    Vicente Tsang is a 44 y.o. male who presentstoday for his medical conditions/complaints as noted below.  Vicente Tsang is c/o of  Chief Complaint   Patient presents with    Anxiety         HPI:     Presents for recheck on chronic conditions  BP well controlled  Weight is stable    Anxiety well managed with use of clonazepam prn  Has filled 30 tabs 2 x this year  Denies any side effects with use of med    Has noted urinary frequency, will wake nightly   Denies any dysuria  Willing to update annual labs  Declines colon cancer screen    Denies any other problems/concerns        Hemoglobin A1C (%)   Date Value   02/24/2023 5.7             ( goal A1C is < 7)   No components found for: \"LABMICR\"  No components found for: \"LDLCHOLESTEROL\", \"LDLCALC\"    (goal LDL is <100)   AST (U/L)   Date Value   02/24/2023 25     ALT (U/L)   Date Value   02/24/2023 25     BUN (mg/dL)   Date Value   02/24/2023 12     BP Readings from Last 3 Encounters:   11/15/24 124/72   12/07/23 118/80   02/24/23 (!) 152/96          ([id number])    Past Medical History:   Diagnosis Date    Anxiety     Asthma     ETOH abuse     Hypertension     Pancreatitis 07/2021      Past Surgical History:   Procedure Laterality Date    CARDIOVASCULAR STRESS TEST      > 5 yrs ago    CYST INCISION AND DRAINAGE      spider bite    KNEE SURGERY Right        Family History   Problem Relation Age of Onset    Other Mother         churg wayne syndrome    Emphysema Father     Cancer Maternal Uncle         abdominal area          Social History     Tobacco Use    Smoking status: Former     Current packs/day: 0.00     Types: Cigarettes     Quit date: 5/1/2021     Years since quitting: 3.5    Smokeless tobacco: Never   Substance Use Topics    Alcohol use: Not Currently     Comment: daily beer or wine (he

## 2025-01-29 DIAGNOSIS — F41.9 ANXIETY: ICD-10-CM

## 2025-01-29 DIAGNOSIS — I10 ESSENTIAL HYPERTENSION: ICD-10-CM

## 2025-01-29 DIAGNOSIS — Z79.899 MEDICATION MANAGEMENT: ICD-10-CM

## 2025-01-29 RX ORDER — IRBESARTAN 150 MG/1
TABLET ORAL
Qty: 90 TABLET | Refills: 1 | Status: SHIPPED | OUTPATIENT
Start: 2025-01-29

## 2025-01-29 RX ORDER — CLONAZEPAM 1 MG/1
1 TABLET ORAL 2 TIMES DAILY PRN
Qty: 60 TABLET | Refills: 0 | Status: SHIPPED | OUTPATIENT
Start: 2025-01-29 | End: 2025-02-28

## 2025-01-29 NOTE — TELEPHONE ENCOUNTER
Vicente Tsang is calling to request a refill on the following medication(s):    Last Visit Date (If Applicable):  11/15/2024    Next Visit Date:    Visit date not found    Medication Request:  Requested Prescriptions     Pending Prescriptions Disp Refills    clonazePAM (KLONOPIN) 1 MG tablet 60 tablet 0     Sig: Take 1 tablet by mouth 2 times daily as needed for Anxiety for up to 30 days.    irbesartan (AVAPRO) 150 MG tablet 90 tablet 1     Sig: TAKE 1 TABLET BY MOUTH DAILY